# Patient Record
Sex: FEMALE | Race: WHITE | NOT HISPANIC OR LATINO | Employment: FULL TIME | URBAN - METROPOLITAN AREA
[De-identification: names, ages, dates, MRNs, and addresses within clinical notes are randomized per-mention and may not be internally consistent; named-entity substitution may affect disease eponyms.]

---

## 2018-05-24 ENCOUNTER — OFFICE VISIT (OUTPATIENT)
Dept: PRIMARY CARE | Facility: CLINIC | Age: 43
End: 2018-05-24
Payer: COMMERCIAL

## 2018-05-24 VITALS
HEIGHT: 66 IN | SYSTOLIC BLOOD PRESSURE: 128 MMHG | HEART RATE: 101 BPM | OXYGEN SATURATION: 96 % | BODY MASS INDEX: 37.41 KG/M2 | TEMPERATURE: 98.2 F | DIASTOLIC BLOOD PRESSURE: 86 MMHG | RESPIRATION RATE: 16 BRPM | WEIGHT: 232.8 LBS

## 2018-05-24 DIAGNOSIS — K58.0 IRRITABLE BOWEL SYNDROME WITH DIARRHEA: ICD-10-CM

## 2018-05-24 DIAGNOSIS — Z00.00 ENCOUNTER FOR GENERAL ADULT MEDICAL EXAMINATION WITHOUT ABNORMAL FINDINGS: Primary | ICD-10-CM

## 2018-05-24 DIAGNOSIS — M19.90 ARTHRITIS: ICD-10-CM

## 2018-05-24 DIAGNOSIS — F17.210 CIGARETTE NICOTINE DEPENDENCE WITHOUT COMPLICATION: ICD-10-CM

## 2018-05-24 PROBLEM — K58.9 IBS (IRRITABLE BOWEL SYNDROME): Status: ACTIVE | Noted: 2018-05-24

## 2018-05-24 PROCEDURE — 99386 PREV VISIT NEW AGE 40-64: CPT | Performed by: FAMILY MEDICINE

## 2018-05-24 RX ORDER — VARENICLINE TARTRATE 0.5 (11)-1
KIT ORAL
Qty: 53 TABLET | Refills: 0 | Status: SHIPPED | OUTPATIENT
Start: 2018-05-24 | End: 2019-02-19

## 2018-05-24 ASSESSMENT — ENCOUNTER SYMPTOMS
APPETITE CHANGE: 0
FEVER: 0
SEIZURES: 0
FATIGUE: 0
ADENOPATHY: 0
HEADACHES: 0
VOMITING: 0
DYSPHORIC MOOD: 0
ABDOMINAL PAIN: 0
RHINORRHEA: 0
CONSTIPATION: 0
ARTHRALGIAS: 0
NAUSEA: 0
FREQUENCY: 0
COUGH: 0
BLOOD IN STOOL: 0
SHORTNESS OF BREATH: 0
UNEXPECTED WEIGHT CHANGE: 0
DYSURIA: 0
WEAKNESS: 0
SORE THROAT: 0
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
## 2018-05-24 NOTE — ASSESSMENT & PLAN NOTE
-Eat diet with regular meals including 2-4 fruit servings , 2-4 vegetable servings, whole grains and lean protien each day  -control portions of carbs and keep down fats and sugars in diet  -exercise for 150 mins per week of cardio or more and 1-2 days per week of something strengthening like yoga, pilates or lifting  -wear sunblock w SPF of 30 or higher  in sun to protect your skin, and reapply often  -avoid all tobacco products  -limit alcohol and caffiene  -aim to get 6-8 hrs of sleep each night  -see your eye doctor every 2-3 yrs  -see your dentist every 6-12 mos  -BW and mammo ordered  -gyn # given  FU for physical in one year

## 2018-08-01 RX ORDER — VARENICLINE TARTRATE 0.5 MG/1
1 TABLET, FILM COATED ORAL 2 TIMES DAILY
Qty: 360 TABLET | Refills: 0 | Status: SHIPPED | OUTPATIENT
Start: 2018-08-01 | End: 2018-09-19 | Stop reason: ALTCHOICE

## 2018-08-01 NOTE — TELEPHONE ENCOUNTER
Called pt to determine if done starting dose pack. No answer, so LVM for return call  Spoke w pharm, and they are unsure.

## 2018-09-19 ENCOUNTER — OFFICE VISIT (OUTPATIENT)
Dept: OBSTETRICS AND GYNECOLOGY | Facility: CLINIC | Age: 43
End: 2018-09-19
Payer: COMMERCIAL

## 2018-09-19 VITALS
HEIGHT: 67 IN | WEIGHT: 234.4 LBS | SYSTOLIC BLOOD PRESSURE: 112 MMHG | BODY MASS INDEX: 36.79 KG/M2 | DIASTOLIC BLOOD PRESSURE: 70 MMHG

## 2018-09-19 DIAGNOSIS — F32.81 PMDD (PREMENSTRUAL DYSPHORIC DISORDER): ICD-10-CM

## 2018-09-19 DIAGNOSIS — G43.829 MENSTRUAL MIGRAINE WITHOUT STATUS MIGRAINOSUS, NOT INTRACTABLE: ICD-10-CM

## 2018-09-19 DIAGNOSIS — Z01.419 ENCOUNTER FOR GYNECOLOGICAL EXAMINATION: Primary | ICD-10-CM

## 2018-09-19 PROCEDURE — 99386 PREV VISIT NEW AGE 40-64: CPT | Mod: 25 | Performed by: OBSTETRICS & GYNECOLOGY

## 2018-09-19 RX ORDER — LEVONORGESTREL / ETHINYL ESTRADIOL AND ETHINYL ESTRADIOL 150-30(84)
1 KIT ORAL DAILY
Qty: 91 TABLET | Refills: 0 | Status: SHIPPED | OUTPATIENT
Start: 2018-09-19 | End: 2018-11-27 | Stop reason: SDUPTHER

## 2018-09-19 NOTE — PROGRESS NOTES
"2018  Indu Epstein is a 43 y.o. female who presents for annual exam.      Over the last 2-3 months has noted significant emotional changes prior to periods, irritable, has had suicidal thoughts.  No plan.  Generally not a depressed person.       Just quit smoking 4 months ago.     OB History:   Obstetric History       T0      L0     SAB0   TAB0   Ectopic0   Multiple0   Live Births0       # Outcome Date GA Lbr Augustin/2nd Weight Sex Delivery Anes PTL Lv   3 Para            2 Para            1 Para               Obstetric Comments   3 FTSVDS   No gHTN/ gDM       Gyn History:    Patient's last menstrual period was 2018.  Menses monthly q 24-28, lasts 2-6 days, flow is occasionally heavy- using super plus, changing every 2 hours, no intermenstrual spotting, cramps - doesn't take medication, heating pad helps  ? Fibroids  No hx of STIs  Pap- history of abn paps, 7 years ago- had colposcopies which were normal   Sexually active with 1 partner (together 2 months)- no pain/ bleeding   Contraception- condoms \"all of the time\"  Mammo- has not had       Medical History:   Past Medical History:   Diagnosis Date   • Arthritis     knees   • IBS (irritable bowel syndrome)     w diarrhea       Surgical History:   Past Surgical History:   Procedure Laterality Date   • CHOLECYSTECTOMY     • TONSILLECTOMY         Allergies: Penicillins; Sulfasalazine; and Zinc    Prior to Admission medications    Medication Sig Start Date End Date Taking? Authorizing Provider   varenicline (CHANTIX) 0.5 mg tablet Take 2 tablets (1 mg total) by mouth 2 (two) times a day. Take with full glass of water.  Patient not taking: Reported on 2018  Amy Morris DO        Social History: live with kids    No exercise   Social History     Social History   • Marital status: Single     Spouse name: N/A   • Number of children: 3   • Years of education: N/A     Occupational History   • manages " "[employer]      Social History Main Topics   • Smoking status: Current Every Day Smoker     Packs/day: 0.75     Years: 15.00   • Smokeless tobacco: Never Used      Comment: quit 3x in past w Chantix   • Alcohol use No   • Drug use: No   • Sexual activity: Not on file     Other Topics Concern   • Not on file     Social History Narrative   • No narrative on file        Family History:   Family History   Problem Relation Age of Onset   • Hypertension Mother    • Arthritis Mother    • Melanoma Mother    • Lung cancer Maternal Grandfather    • Melanoma Paternal Grandfather        Review of Systems and Physical Exam  The following have been reviewed and updated as appropriate in this visit: Allergies       /70   Ht 1.702 m (5' 7\")   Wt 106 kg (234 lb 6.4 oz)   LMP 09/04/2018   BMI 36.71 kg/m²   HPI  Review of Systems  Physical Exam   Constitutional: She is oriented to person, place, and time. She appears well-developed and well-nourished.   Genitourinary: Vagina normal and uterus normal.   External female genitalia normal.   Urethral meatus normal.   Urethra normal.   Normal bladder.   Vagina normal.   Cervix exam normal.  Uterus is normal. Uterine contour is regular.   Adnexa normal.   Neck: Normal range of motion. Neck supple. No thyromegaly present.   Cardiovascular: Normal rate, regular rhythm and normal heart sounds.    Pulmonary/Chest: Effort normal and breath sounds normal. No respiratory distress. She has no wheezes. She has no rales. Right breast exhibits no inverted nipple, no mass, no nipple discharge, no skin change and no tenderness. Left breast exhibits no inverted nipple, no mass, no nipple discharge, no skin change and no tenderness.   Abdominal: Soft. She exhibits no distension and no mass. There is no tenderness. There is no rebound.   Neurological: She is alert and oriented to person, place, and time.   Psychiatric: She has a normal mood and affect. Her behavior is normal. Thought content " normal.       Diagnoses and all orders for this visit:    Encounter for gynecological examination  Normal exam  Pap with HPV collected  Has script for mammogram    PMDD (premenstrual dysphoric disorder)  Discussed management options including SSRIs and OCPs  She just quit smoking 4 months ago successfully  Would like to take OCPs- discussed SE.  Will start on seasonique.  To return to office in 2 months for a BP check  Discussed that if she is to start smoking again she needs to stop right away!    Menstrual migraine without status migrainosus, not intractable  OCPs started     Other orders  -     L norgest/e.estradiol-e.estrad (SEASONIQUE) 0.15 mg-30 mcg (84)/10 mcg (7) tablet; Take 1 tablet by mouth daily.    Nancy Garner MD

## 2018-09-26 LAB
CYTOLOGIST CVX/VAG CYTO: NORMAL
CYTOLOGY CVX/VAG DOC THIN PREP: NORMAL
DX ICD CODE: NORMAL
HPV I/H RISK 4 DNA CVX QL PROBE+SIG AMP: NEGATIVE
LAB CORP NOTE:: NORMAL
LAB CORP QC REVIEWED BY:: NORMAL
OTHER STN SPEC: NORMAL
PATH REPORT.FINAL DX SPEC: NORMAL
STAT OF ADQ CVX/VAG CYTO-IMP: NORMAL

## 2018-11-27 ENCOUNTER — OFFICE VISIT (OUTPATIENT)
Dept: OBSTETRICS AND GYNECOLOGY | Facility: CLINIC | Age: 43
End: 2018-11-27
Payer: COMMERCIAL

## 2018-11-27 VITALS
SYSTOLIC BLOOD PRESSURE: 128 MMHG | BODY MASS INDEX: 37.57 KG/M2 | HEIGHT: 66 IN | WEIGHT: 233.8 LBS | DIASTOLIC BLOOD PRESSURE: 88 MMHG

## 2018-11-27 DIAGNOSIS — Z01.30 BP CHECK: Primary | ICD-10-CM

## 2018-11-27 PROCEDURE — 99212 OFFICE O/P EST SF 10 MIN: CPT | Performed by: NURSE PRACTITIONER

## 2018-11-27 RX ORDER — LEVONORGESTREL / ETHINYL ESTRADIOL AND ETHINYL ESTRADIOL 150-30(84)
1 KIT ORAL DAILY
Qty: 91 TABLET | Refills: 2 | Status: SHIPPED | OUTPATIENT
Start: 2018-11-27 | End: 2019-08-19 | Stop reason: SDUPTHER

## 2018-11-27 NOTE — PROGRESS NOTES
43 year old happy with Seasonique, no BTB. Has continued to not smoke!    B/P WNL  NO CI to continue FARIDA's-Rx sent out  Not smoking-doing well  RTO: AV/PRN

## 2019-02-19 ENCOUNTER — OFFICE VISIT (OUTPATIENT)
Dept: PRIMARY CARE | Facility: CLINIC | Age: 44
End: 2019-02-19
Payer: COMMERCIAL

## 2019-02-19 VITALS
HEART RATE: 108 BPM | WEIGHT: 231.2 LBS | OXYGEN SATURATION: 98 % | TEMPERATURE: 97.4 F | RESPIRATION RATE: 16 BRPM | DIASTOLIC BLOOD PRESSURE: 88 MMHG | SYSTOLIC BLOOD PRESSURE: 146 MMHG | HEIGHT: 66 IN | BODY MASS INDEX: 37.16 KG/M2

## 2019-02-19 DIAGNOSIS — M54.42 ACUTE BILATERAL LOW BACK PAIN WITH LEFT-SIDED SCIATICA: Primary | ICD-10-CM

## 2019-02-19 PROBLEM — F17.210 CIGARETTE NICOTINE DEPENDENCE WITHOUT COMPLICATION: Status: RESOLVED | Noted: 2018-05-24 | Resolved: 2019-02-19

## 2019-02-19 PROCEDURE — 99214 OFFICE O/P EST MOD 30 MIN: CPT | Performed by: FAMILY MEDICINE

## 2019-02-19 RX ORDER — CYCLOBENZAPRINE HCL 10 MG
10 TABLET ORAL 3 TIMES DAILY PRN
Qty: 30 TABLET | Refills: 0 | Status: SHIPPED | OUTPATIENT
Start: 2019-02-19 | End: 2019-04-03

## 2019-02-19 RX ORDER — METHYLPREDNISOLONE 4 MG/1
4 TABLET ORAL
Qty: 21 TABLET | Refills: 0 | Status: SHIPPED | OUTPATIENT
Start: 2019-02-19 | End: 2019-04-03

## 2019-02-19 ASSESSMENT — ENCOUNTER SYMPTOMS
MYALGIAS: 0
PALPITATIONS: 0
FATIGUE: 0
WEAKNESS: 0
ACTIVITY CHANGE: 0
ARTHRALGIAS: 0
JOINT SWELLING: 0
DIZZINESS: 0
FEVER: 0
FREQUENCY: 0
SHORTNESS OF BREATH: 0
APPETITE CHANGE: 0
NUMBNESS: 0
DIFFICULTY URINATING: 0
DYSPHORIC MOOD: 0
HEADACHES: 0
ABDOMINAL PAIN: 0
AGITATION: 0
UNEXPECTED WEIGHT CHANGE: 0
BACK PAIN: 1
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
## 2019-02-19 NOTE — ASSESSMENT & PLAN NOTE
Back care and education   Exercise sheet given  Heat, rest , short walks as tolerated  Medrol dose pack, hold Ibuprofen during this  Cyclobenzaprine TID prn  Call if no better or worse

## 2019-04-03 ENCOUNTER — OFFICE VISIT (OUTPATIENT)
Dept: PRIMARY CARE | Facility: CLINIC | Age: 44
End: 2019-04-03
Payer: COMMERCIAL

## 2019-04-03 VITALS
OXYGEN SATURATION: 98 % | TEMPERATURE: 97.3 F | SYSTOLIC BLOOD PRESSURE: 122 MMHG | WEIGHT: 234.2 LBS | RESPIRATION RATE: 16 BRPM | BODY MASS INDEX: 37.64 KG/M2 | HEIGHT: 66 IN | HEART RATE: 85 BPM | DIASTOLIC BLOOD PRESSURE: 84 MMHG

## 2019-04-03 DIAGNOSIS — R60.0 LOCALIZED EDEMA: ICD-10-CM

## 2019-04-03 DIAGNOSIS — J30.2 SEASONAL ALLERGIC RHINITIS, UNSPECIFIED TRIGGER: ICD-10-CM

## 2019-04-03 DIAGNOSIS — K21.9 GERD WITHOUT ESOPHAGITIS: ICD-10-CM

## 2019-04-03 DIAGNOSIS — G89.29 CHRONIC CHEST WALL PAIN: Primary | ICD-10-CM

## 2019-04-03 DIAGNOSIS — R07.89 CHRONIC CHEST WALL PAIN: Primary | ICD-10-CM

## 2019-04-03 PROCEDURE — 99214 OFFICE O/P EST MOD 30 MIN: CPT | Performed by: FAMILY MEDICINE

## 2019-04-03 ASSESSMENT — ENCOUNTER SYMPTOMS
NAUSEA: 0
DYSURIA: 0
DYSPHORIC MOOD: 0
COUGH: 0
COLOR CHANGE: 0
SHORTNESS OF BREATH: 0
ROS GI COMMENTS: NO CHANGE IN BMS  NO GER
SORE THROAT: 0
ABDOMINAL PAIN: 0
NUMBNESS: 0
VOMITING: 0
FREQUENCY: 0
WHEEZING: 0
FEVER: 0
UNEXPECTED WEIGHT CHANGE: 0
WEAKNESS: 0

## 2019-04-03 NOTE — ASSESSMENT & PLAN NOTE
Which may be a pinched nerve or muscular so gradually increasing low impact cardio, avoiding heavy lifting reviewed and if returns and not improving then to call back

## 2019-04-03 NOTE — ASSESSMENT & PLAN NOTE
Triggers reviewed such as salt, heat, standing or sitting prolongedly, wt gain.  Discussed actions to help, wt loss, compression stockings, elevation, keeping down salt and concerning changes or additional symptoms or no improvement to be reported
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
## 2019-05-31 LAB
ALBUMIN SERPL-MCNC: 4.4 G/DL (ref 3.5–5.5)
ALBUMIN/GLOB SERPL: 1.8 {RATIO} (ref 1.2–2.2)
ALP SERPL-CCNC: 51 IU/L (ref 39–117)
ALT SERPL-CCNC: 26 IU/L (ref 0–32)
AST SERPL-CCNC: 18 IU/L (ref 0–40)
BASOPHILS # BLD AUTO: 0 X10E3/UL (ref 0–0.2)
BASOPHILS NFR BLD AUTO: 0 %
BILIRUB SERPL-MCNC: 0.4 MG/DL (ref 0–1.2)
BUN SERPL-MCNC: 13 MG/DL (ref 6–24)
BUN/CREAT SERPL: 17 (ref 9–23)
CALCIUM SERPL-MCNC: 9.3 MG/DL (ref 8.7–10.2)
CHLORIDE SERPL-SCNC: 108 MMOL/L (ref 96–106)
CHOLEST SERPL-MCNC: 173 MG/DL (ref 100–199)
CO2 SERPL-SCNC: 18 MMOL/L (ref 20–29)
CREAT SERPL-MCNC: 0.78 MG/DL (ref 0.57–1)
EOSINOPHIL # BLD AUTO: 0.2 X10E3/UL (ref 0–0.4)
EOSINOPHIL NFR BLD AUTO: 6 %
ERYTHROCYTE [DISTWIDTH] IN BLOOD BY AUTOMATED COUNT: 13.5 % (ref 12.3–15.4)
GLOBULIN SER CALC-MCNC: 2.5 G/DL (ref 1.5–4.5)
GLUCOSE SERPL-MCNC: 89 MG/DL (ref 65–99)
HBA1C MFR BLD: 5.6 % (ref 4.8–5.6)
HCT VFR BLD AUTO: 39.5 % (ref 34–46.6)
HDLC SERPL-MCNC: 37 MG/DL
HGB BLD-MCNC: 13 G/DL (ref 11.1–15.9)
IMM GRANULOCYTES # BLD AUTO: 0 X10E3/UL (ref 0–0.1)
IMM GRANULOCYTES NFR BLD AUTO: 0 %
LAB CORP EGFR IF AFRICN AM: 108 ML/MIN/1.73
LAB CORP EGFR IF NONAFRICN AM: 93 ML/MIN/1.73
LDLC SERPL CALC-MCNC: 113 MG/DL (ref 0–99)
LYMPHOCYTES # BLD AUTO: 1.5 X10E3/UL (ref 0.7–3.1)
LYMPHOCYTES NFR BLD AUTO: 50 %
MCH RBC QN AUTO: 29.6 PG (ref 26.6–33)
MCHC RBC AUTO-ENTMCNC: 32.9 G/DL (ref 31.5–35.7)
MCV RBC AUTO: 90 FL (ref 79–97)
MONOCYTES # BLD AUTO: 0.2 X10E3/UL (ref 0.1–0.9)
MONOCYTES NFR BLD AUTO: 7 %
NEUTROPHILS # BLD AUTO: 1.1 X10E3/UL (ref 1.4–7)
NEUTROPHILS NFR BLD AUTO: 37 %
PLATELET # BLD AUTO: 266 X10E3/UL (ref 150–450)
POTASSIUM SERPL-SCNC: 4.4 MMOL/L (ref 3.5–5.2)
PROT SERPL-MCNC: 6.9 G/DL (ref 6–8.5)
RBC # BLD AUTO: 4.39 X10E6/UL (ref 3.77–5.28)
SODIUM SERPL-SCNC: 141 MMOL/L (ref 134–144)
SPECIMEN STATUS: NORMAL
TRIGL SERPL-MCNC: 116 MG/DL (ref 0–149)
TSH SERPL DL<=0.005 MIU/L-ACNC: 1.81 UIU/ML (ref 0.45–4.5)
VLDLC SERPL CALC-MCNC: 23 MG/DL (ref 5–40)
WBC # BLD AUTO: 3 X10E3/UL (ref 3.4–10.8)

## 2019-06-03 ENCOUNTER — TELEPHONE (OUTPATIENT)
Dept: PRIMARY CARE | Facility: CLINIC | Age: 44
End: 2019-06-03

## 2019-06-03 DIAGNOSIS — D72.9 ABNORMAL WBC COUNT: Primary | ICD-10-CM

## 2019-06-03 NOTE — TELEPHONE ENCOUNTER
----- Message from Amy Morris, DO sent at 6/3/2019 12:44 PM EDT -----  Indu, Please let patient know that thyroid, BS,kidneys,liver,electrolytes and Chol w breakdown are all normal. She is not anemic , but her WBC is a little low which may just be a fluctuation but to be sure I would like her to rechk CBC in 3 wks , so before our nxt appt , to be sure.

## 2019-06-28 ENCOUNTER — TELEPHONE (OUTPATIENT)
Dept: PRIMARY CARE | Facility: CLINIC | Age: 44
End: 2019-06-28

## 2019-06-28 DIAGNOSIS — D72.9 ABNORMAL WBC COUNT: Primary | ICD-10-CM

## 2019-06-28 LAB
ERYTHROCYTE [DISTWIDTH] IN BLOOD BY AUTOMATED COUNT: 13.8 % (ref 12.3–15.4)
HCT VFR BLD AUTO: 38 % (ref 34–46.6)
HGB BLD-MCNC: 12.9 G/DL (ref 11.1–15.9)
MCH RBC QN AUTO: 30.7 PG (ref 26.6–33)
MCHC RBC AUTO-ENTMCNC: 33.9 G/DL (ref 31.5–35.7)
MCV RBC AUTO: 91 FL (ref 79–97)
PLATELET # BLD AUTO: 257 X10E3/UL (ref 150–450)
RBC # BLD AUTO: 4.2 X10E6/UL (ref 3.77–5.28)
WBC # BLD AUTO: 2.8 X10E3/UL (ref 3.4–10.8)

## 2019-06-28 NOTE — TELEPHONE ENCOUNTER
----- Message from Amy Morris DO sent at 6/28/2019  1:06 PM EDT -----  Let her knkow that her blood count is fairly stable compared to last so just recheck it in 6 wks again

## 2019-06-28 NOTE — TELEPHONE ENCOUNTER
Let her knkow that her blood count is fairly stable compared to last so just recheck it in 6 wks again

## 2019-06-28 NOTE — TELEPHONE ENCOUNTER
No need to be unless it is changing a lot but is likely just a normal variation since rest of blood count is stable so just being cautious to be sure it is not changing.

## 2019-07-02 ENCOUNTER — OFFICE VISIT (OUTPATIENT)
Dept: PRIMARY CARE | Facility: CLINIC | Age: 44
End: 2019-07-02
Payer: COMMERCIAL

## 2019-07-02 VITALS
RESPIRATION RATE: 16 BRPM | HEIGHT: 66 IN | SYSTOLIC BLOOD PRESSURE: 144 MMHG | BODY MASS INDEX: 35 KG/M2 | WEIGHT: 217.8 LBS | TEMPERATURE: 97.9 F | HEART RATE: 87 BPM | DIASTOLIC BLOOD PRESSURE: 98 MMHG | OXYGEN SATURATION: 98 %

## 2019-07-02 DIAGNOSIS — K21.9 GERD WITHOUT ESOPHAGITIS: ICD-10-CM

## 2019-07-02 DIAGNOSIS — Z00.00 ENCOUNTER FOR GENERAL ADULT MEDICAL EXAMINATION WITHOUT ABNORMAL FINDINGS: Primary | ICD-10-CM

## 2019-07-02 DIAGNOSIS — G89.29 CHRONIC RUQ PAIN: ICD-10-CM

## 2019-07-02 DIAGNOSIS — J30.2 SEASONAL ALLERGIC RHINITIS, UNSPECIFIED TRIGGER: ICD-10-CM

## 2019-07-02 DIAGNOSIS — R10.11 CHRONIC RUQ PAIN: ICD-10-CM

## 2019-07-02 PROCEDURE — 90471 IMMUNIZATION ADMIN: CPT | Performed by: FAMILY MEDICINE

## 2019-07-02 PROCEDURE — 90715 TDAP VACCINE 7 YRS/> IM: CPT | Performed by: FAMILY MEDICINE

## 2019-07-02 PROCEDURE — 99396 PREV VISIT EST AGE 40-64: CPT | Mod: 25 | Performed by: FAMILY MEDICINE

## 2019-07-02 ASSESSMENT — ENCOUNTER SYMPTOMS
WEAKNESS: 0
UNEXPECTED WEIGHT CHANGE: 0
FATIGUE: 0
SINUS PRESSURE: 1
RHINORRHEA: 0
BLOOD IN STOOL: 0
DYSPHORIC MOOD: 0
COUGH: 0
BACK PAIN: 1
FEVER: 0
ARTHRALGIAS: 0
NAUSEA: 0
VOMITING: 0
SORE THROAT: 0
SHORTNESS OF BREATH: 0
CONSTIPATION: 0
FREQUENCY: 0
APPETITE CHANGE: 0
ABDOMINAL PAIN: 0
DIARRHEA: 0
WHEEZING: 0
SEIZURES: 0
DYSURIA: 0
ADENOPATHY: 0
HEADACHES: 0
EYE DISCHARGE: 0

## 2019-07-02 NOTE — ASSESSMENT & PLAN NOTE
-Eat diet with regular meals including 2-4 fruit servings , 2-4 vegetable servings, whole grains and lean protien each day  -control portions of carbs and keep down fats and sugars in diet  -exercise for 150 mins per week of cardio or more and 1-2 days per week of something strengthening like yoga, pilates or lifting  -wear sunblock w SPF of 30 or higher  in sun to protect your skin, and reapply often  -avoid all tobacco products  -limit alcohol and caffiene  -aim to get 6-8 hrs of sleep each night  -see your eye doctor every 2-3 yrs  -see your dentist every 6-12 mos  -TdaP given   -mammo ordered  FU for physical in one year
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
## 2019-07-25 ENCOUNTER — HOSPITAL ENCOUNTER (OUTPATIENT)
Dept: RADIOLOGY | Age: 44
Discharge: HOME | End: 2019-07-25
Attending: FAMILY MEDICINE
Payer: COMMERCIAL

## 2019-07-25 DIAGNOSIS — R10.11 CHRONIC RUQ PAIN: ICD-10-CM

## 2019-07-25 DIAGNOSIS — G89.29 CHRONIC RUQ PAIN: ICD-10-CM

## 2019-07-25 DIAGNOSIS — Z00.00 ENCOUNTER FOR GENERAL ADULT MEDICAL EXAMINATION WITHOUT ABNORMAL FINDINGS: ICD-10-CM

## 2019-07-25 PROCEDURE — 77063 BREAST TOMOSYNTHESIS BI: CPT

## 2019-07-25 PROCEDURE — 76705 ECHO EXAM OF ABDOMEN: CPT

## 2019-07-26 ENCOUNTER — TELEPHONE (OUTPATIENT)
Dept: PRIMARY CARE | Facility: CLINIC | Age: 44
End: 2019-07-26

## 2019-07-26 ENCOUNTER — TRANSCRIBE ORDERS (OUTPATIENT)
Dept: RADIOLOGY | Age: 44
End: 2019-07-26

## 2019-07-26 DIAGNOSIS — R92.8 OTHER ABNORMAL AND INCONCLUSIVE FINDINGS ON DIAGNOSTIC IMAGING OF BREAST: Primary | ICD-10-CM

## 2019-07-26 NOTE — TELEPHONE ENCOUNTER
----- Message from Amy Morris DO sent at 7/25/2019  9:05 PM EDT -----  Please let her know radiologist would like her to return for follow-up views due to some asymmetry

## 2019-07-26 NOTE — TELEPHONE ENCOUNTER
----- Message from Amy Morrsi DO sent at 7/25/2019  9:15 PM EDT -----  Also let her know that her ultrasound appears to be normal

## 2019-07-30 ENCOUNTER — HOSPITAL ENCOUNTER (OUTPATIENT)
Dept: RADIOLOGY | Age: 44
Discharge: HOME | End: 2019-07-30
Attending: RADIOLOGY
Payer: COMMERCIAL

## 2019-07-30 DIAGNOSIS — R92.8 OTHER ABNORMAL AND INCONCLUSIVE FINDINGS ON DIAGNOSTIC IMAGING OF BREAST: ICD-10-CM

## 2019-07-30 PROCEDURE — 77065 DX MAMMO INCL CAD UNI: CPT | Mod: RT

## 2019-07-30 PROCEDURE — 76642 ULTRASOUND BREAST LIMITED: CPT | Mod: RT

## 2019-07-30 NOTE — PROGRESS NOTES
Indu,  Radiology would like you to get a FU on your study in 6 mos for stability on cyst they see.  - Dr HERRERA

## 2019-08-16 ENCOUNTER — TELEPHONE (OUTPATIENT)
Dept: PRIMARY CARE | Facility: CLINIC | Age: 44
End: 2019-08-16

## 2019-08-16 DIAGNOSIS — R79.89 ABNORMAL CBC: Primary | ICD-10-CM

## 2019-08-16 LAB
ERYTHROCYTE [DISTWIDTH] IN BLOOD BY AUTOMATED COUNT: 13.9 % (ref 12.3–15.4)
HCT VFR BLD AUTO: 38.7 % (ref 34–46.6)
HGB BLD-MCNC: 12.8 G/DL (ref 11.1–15.9)
MCH RBC QN AUTO: 30.5 PG (ref 26.6–33)
MCHC RBC AUTO-ENTMCNC: 33.1 G/DL (ref 31.5–35.7)
MCV RBC AUTO: 92 FL (ref 79–97)
NRBC BLD AUTO-RTO: 1 %
PLATELET # BLD AUTO: 249 X10E3/UL (ref 150–450)
RBC # BLD AUTO: 4.2 X10E6/UL (ref 3.77–5.28)
WBC # BLD AUTO: 2.9 X10E3/UL (ref 3.4–10.8)

## 2019-08-16 NOTE — TELEPHONE ENCOUNTER
PT informed. She would like to know if she should be concerned about the NRCB being at a 1. She looked into it and is very worried.    Pt also stated her follow up breast US showed cysts. She has been trying to lose more weight and is noticing now as she is losing lbs that the cyst is protruding more and is tender when she brushes it with her arm. She wondered instead of waiting for a follow up mammo/US, if having it drained or removed would be an option? She just has concerns that maybe it is getting bigger and that is the reason for the tenderness.

## 2019-08-16 NOTE — TELEPHONE ENCOUNTER
Since she has remained stable w WBC I don't feel anything worrisome is going on now so we will track it w the FU BW.   If the cyst is uncomfortable I would like her to see a breast specialist so give her Dr Rivas's # to get an eval with for this.

## 2019-08-16 NOTE — TELEPHONE ENCOUNTER
----- Message from Amy Morris, DO sent at 8/16/2019 12:46 PM EDT -----  Let her know that her blood count is stable so I just want her to rechk it in 2 mos

## 2019-08-19 ENCOUNTER — TELEPHONE (OUTPATIENT)
Dept: OBSTETRICS AND GYNECOLOGY | Facility: CLINIC | Age: 44
End: 2019-08-19

## 2019-08-19 RX ORDER — LEVONORGESTREL / ETHINYL ESTRADIOL AND ETHINYL ESTRADIOL 150-30(84)
1 KIT ORAL DAILY
Qty: 91 TABLET | Refills: 0 | Status: SHIPPED | OUTPATIENT
Start: 2019-08-19 | End: 2019-09-23 | Stop reason: SDUPTHER

## 2019-09-18 NOTE — PROGRESS NOTES
2019  Indu Pringle is a 44 y.o. female who presents for annual examination.      Noted dark on R vulva- tried to pull it off with a tweezer and bled profusely.  Thought initially was a tick.    Over this year has been found to have a low white blood cell count.  Mammogram abnormal with cyst in R breast.  Plans to f/u with Dr. Tj Fall .      Started on Seasonique for PMDD and menstrual migraines- happy with method.  Not getting menses.    Pap 18 neg cytology/ HPV   Mammogram- 19 dx BIRADs 3 , 1.  Lesions within the right breast which have imaging characteristics of a  complicated cyst (at 9:00) and a simple cyst (at 10:00).  Follow-up mammography  and sonography with attention to the complicated cyst would be recommended in  six months.    Gyn History:    Not getting menses on Seasonique.    ? Fibroids  No hx of STIs  Pap- history of abn paps, 7 years ago- had colposcopies which were normal   Sexually active with 1 partner (together 1.5 years)- no pain/ bleeding   Contraception- condoms and OCPs  Pap 18 neg cytology/ HPV   Mammo 19 BIRADs 3, f/u in 6 months .  1.  Lesions within the right breast which have imaging characteristics of a  complicated cyst (at 9:00) and a simple cyst (at 10:00).  Follow-up mammography  and sonography with attention to the complicated cyst would be recommended in  six months.  Colonoscopy- has not had but might due to IBS, sees Dr. Florian       OB History:   Obstetric History       T0      L0     SAB0   TAB0   Ectopic0   Multiple0   Live Births0       # Outcome Date GA Lbr Augustin/2nd Weight Sex Delivery Anes PTL Lv   3 Para            2 Para            1 Para               Obstetric Comments   3 FTSVDS   No gHTN/ gDM         Medical History:   Past Medical History:   Diagnosis Date   • Arthritis     knees   • GERD without esophagitis    • IBS (irritable bowel syndrome)     w diarrhea   • Migraines     prior to or after periods, no aura   "      Surgical History:   Past Surgical History:   Procedure Laterality Date   • CHOLECYSTECTOMY     • TONSILLECTOMY         Allergies: Penicillins; Sulfasalazine; and Zinc    Prior to Admission medications    Medication Sig Start Date End Date Taking? Authorizing Provider   L norgest/e.estradiol-e.estrad (SEASONIQUE) 0.15 mg-30 mcg (84)/10 mcg (7) tablet Take 1 tablet by mouth daily. 8/19/19 8/18/20  Janel Ambrose CRNP        Social History:   Social History     Social History   • Marital status: Single     Spouse name: N/A   • Number of children: 3   • Years of education: N/A     Occupational History   • manages [employer]      Social History Main Topics   • Smoking status: Former Smoker     Packs/day: 0.75     Years: 15.00   • Smokeless tobacco: Never Used      Comment: quit 5/2018   • Alcohol use No   • Drug use: No   • Sexual activity: Not on file     Other Topics Concern   • Not on file     Social History Narrative   • No narrative on file        Family History:   Family History   Problem Relation Age of Onset   • Hypertension Biological Mother    • Arthritis Biological Mother    • Melanoma Biological Mother    • Lung cancer Maternal Grandfather    • Melanoma Maternal Grandfather    • Breast cancer Neg Hx    • Colon cancer Neg Hx    • Ovarian cancer Neg Hx        Review of Systems and Physical Exam  The following have been reviewed and updated as appropriate in this visit:      /90   Ht 1.676 m (5' 6\")   Wt 97.5 kg (215 lb)   LMP  (LMP Unknown)   BMI 34.70 kg/m²   HPI  Review of Systems  Physical Exam   Constitutional: She is oriented to person, place, and time. She appears well-developed and well-nourished.   Genitourinary: Vagina normal and uterus normal.   External female genitalia normal. There is lesion (small vascular appearing lesion on R vuvla) on the right external genitalia.   Urethral meatus normal.         Urethra normal.   Normal bladder.   Vagina normal.   Cervix exam " normal.  Uterus is normal. Uterine contour is regular.   Adnexa normal.   Neck: Normal range of motion. Neck supple. No thyromegaly present.   Cardiovascular: Normal rate, regular rhythm and normal heart sounds.    Pulmonary/Chest: Effort normal and breath sounds normal. No respiratory distress. She has no wheezes. She has no rales. Right breast exhibits mass (10 oclock, 5cm from nipple, mobile). Right breast exhibits no inverted nipple, no nipple discharge, no skin change and no tenderness. Left breast exhibits no inverted nipple, no mass, no nipple discharge, no skin change and no tenderness.   Abdominal: Soft. She exhibits no distension and no mass. There is no tenderness. There is no rebound.   Neurological: She is alert and oriented to person, place, and time.   Psychiatric: She has a normal mood and affect. Her behavior is normal. Thought content normal.           Diagnoses and all orders for this visit:    Abnormal mammogram of left breast  Has follow up script and appointment with breast surgeons.    Encounter for gynecological examination  Pap up to date  Happy with OCPs- BP elevated today, return in 3 months for BP check.  She understands if she has elevated BP she is at an increase risk of adverse side effects from OCPs.  Recommended IUD (Mirena) instead.  Brochure/ info provided.    Vulvar mass  Recommended biopsy, will return for procedure    Other orders  -     L norgest/e.estradiol-e.estrad (SEASONIQUE) 0.15 mg-30 mcg (84)/10 mcg (7) tablet; Take 1 tablet by mouth daily.  .    BP elevated- no diagnosis of HTN.  Will f/u in 3 months for BP check.   No Follow-up on file.  Nancy Garner MD

## 2019-09-23 ENCOUNTER — OFFICE VISIT (OUTPATIENT)
Dept: OBSTETRICS AND GYNECOLOGY | Facility: CLINIC | Age: 44
End: 2019-09-23
Payer: COMMERCIAL

## 2019-09-23 VITALS
SYSTOLIC BLOOD PRESSURE: 138 MMHG | BODY MASS INDEX: 34.55 KG/M2 | HEIGHT: 66 IN | DIASTOLIC BLOOD PRESSURE: 90 MMHG | WEIGHT: 215 LBS

## 2019-09-23 DIAGNOSIS — N90.89 VULVAR MASS: ICD-10-CM

## 2019-09-23 DIAGNOSIS — R92.8 ABNORMAL MAMMOGRAM OF LEFT BREAST: Primary | ICD-10-CM

## 2019-09-23 DIAGNOSIS — Z01.419 ENCOUNTER FOR GYNECOLOGICAL EXAMINATION: ICD-10-CM

## 2019-09-23 PROCEDURE — 99396 PREV VISIT EST AGE 40-64: CPT | Mod: 25 | Performed by: OBSTETRICS & GYNECOLOGY

## 2019-09-23 RX ORDER — LEVONORGESTREL / ETHINYL ESTRADIOL AND ETHINYL ESTRADIOL 150-30(84)
1 KIT ORAL DAILY
Qty: 91 TABLET | Refills: 0 | Status: SHIPPED | OUTPATIENT
Start: 2019-09-23 | End: 2021-04-05

## 2019-11-04 ENCOUNTER — PROCEDURE VISIT (OUTPATIENT)
Dept: OBSTETRICS AND GYNECOLOGY | Facility: CLINIC | Age: 44
End: 2019-11-04
Payer: COMMERCIAL

## 2019-11-04 VITALS — DIASTOLIC BLOOD PRESSURE: 84 MMHG | SYSTOLIC BLOOD PRESSURE: 160 MMHG

## 2019-11-04 DIAGNOSIS — Z30.430 ENCOUNTER FOR IUD INSERTION: ICD-10-CM

## 2019-11-04 DIAGNOSIS — N90.89 LABIAL LESION: ICD-10-CM

## 2019-11-04 DIAGNOSIS — Z32.02 NEGATIVE PREGNANCY TEST: Primary | ICD-10-CM

## 2019-11-04 LAB — PREGNANCY TEST URINE, POC: NEGATIVE

## 2019-11-04 PROCEDURE — 99999 PR OFFICE/OUTPT VISIT,PROCEDURE ONLY: CPT | Performed by: OBSTETRICS & GYNECOLOGY

## 2019-11-04 PROCEDURE — 81025 URINE PREGNANCY TEST: CPT | Performed by: OBSTETRICS & GYNECOLOGY

## 2019-11-05 ENCOUNTER — TELEPHONE (OUTPATIENT)
Dept: OBSTETRICS AND GYNECOLOGY | Facility: CLINIC | Age: 44
End: 2019-11-05

## 2019-11-05 ENCOUNTER — CLINICAL SUPPORT (OUTPATIENT)
Dept: OBSTETRICS AND GYNECOLOGY | Facility: CLINIC | Age: 44
End: 2019-11-05
Payer: COMMERCIAL

## 2019-11-05 ENCOUNTER — OFFICE VISIT (OUTPATIENT)
Dept: SURGERY | Facility: CLINIC | Age: 44
End: 2019-11-05
Payer: COMMERCIAL

## 2019-11-05 VITALS
SYSTOLIC BLOOD PRESSURE: 137 MMHG | HEART RATE: 71 BPM | WEIGHT: 210 LBS | BODY MASS INDEX: 33.75 KG/M2 | DIASTOLIC BLOOD PRESSURE: 91 MMHG | HEIGHT: 66 IN

## 2019-11-05 VITALS — SYSTOLIC BLOOD PRESSURE: 138 MMHG | DIASTOLIC BLOOD PRESSURE: 88 MMHG

## 2019-11-05 DIAGNOSIS — T83.32XA MALPOSITIONED INTRAUTERINE DEVICE (IUD), INITIAL ENCOUNTER: ICD-10-CM

## 2019-11-05 DIAGNOSIS — Z30.430 ENCOUNTER FOR IUD INSERTION: Primary | ICD-10-CM

## 2019-11-05 DIAGNOSIS — R92.8 ABNORMAL MAMMOGRAM OF LEFT BREAST: Primary | ICD-10-CM

## 2019-11-05 DIAGNOSIS — R92.8 ABNORMAL MAMMOGRAM: ICD-10-CM

## 2019-11-05 PROBLEM — Z00.00 ENCOUNTER FOR GENERAL ADULT MEDICAL EXAMINATION WITHOUT ABNORMAL FINDINGS: Status: RESOLVED | Noted: 2018-05-24 | Resolved: 2019-11-05

## 2019-11-05 PROCEDURE — 76942 ECHO GUIDE FOR BIOPSY: CPT | Performed by: SURGERY

## 2019-11-05 PROCEDURE — 19000 PUNCTURE ASPIR CYST BREAST: CPT | Mod: RT | Performed by: SURGERY

## 2019-11-05 PROCEDURE — 88173 CYTOPATH EVAL FNA REPORT: CPT | Performed by: SURGERY

## 2019-11-05 PROCEDURE — 99203 OFFICE O/P NEW LOW 30 MIN: CPT | Mod: 25 | Performed by: SURGERY

## 2019-11-05 PROCEDURE — 99999 PR OFFICE/OUTPT VISIT,PROCEDURE ONLY: CPT | Performed by: OBSTETRICS & GYNECOLOGY

## 2019-11-05 ASSESSMENT — ENCOUNTER SYMPTOMS
CONSTITUTIONAL NEGATIVE: 1
HEMATOLOGIC/LYMPHATIC NEGATIVE: 1
RESPIRATORY NEGATIVE: 1
ALLERGIC/IMMUNOLOGIC NEGATIVE: 1
EYES NEGATIVE: 1
PSYCHIATRIC NEGATIVE: 1
CARDIOVASCULAR NEGATIVE: 1
NEUROLOGICAL NEGATIVE: 1
GASTROINTESTINAL NEGATIVE: 1
ENDOCRINE NEGATIVE: 1
MUSCULOSKELETAL NEGATIVE: 1

## 2019-11-05 NOTE — PATIENT INSTRUCTIONS
I will call you within 48 hours to review the results of your cytology and make further recommendations regarding your mammographic follow-up.

## 2019-11-05 NOTE — PROGRESS NOTES
Breast Surgical Consultation        Patient: Indu Pringle                                : 1975    Date of consultation: 2019    Referring Provider: No ref. provider found  PCP: Amy Morris DO  GYN: No care team member to display      Chief Complaint: Tender right breast mass     Indu Pringle is a 44 y.o. old female presenting today for further evaluation regarding a tender right breast mass.  She states that she is been on  Seasonique for many years but was recently pulled off this by her gynecologist given her age and hypertension.  She is now officially off the pill and had a Mirena IUD placed yesterday.  In July, she had aRoutine screening mammogram that was reviewed s in the office.  She is modestly dense tissue and there was a rounded ovoid mass in the upper outer portion of the right breast.  This corresponded to 2 cysts by ultrasound.  At 9:00, 5 cm from the nipple there was a 2-1/2 cm minimally septated cyst deemed to be complex for which a six-month follow-up was recommended.  There was additionally a 1.6 cm hypoechoic lesion at 10:00, 3 cm from the nipple which was felt to be completely benign and no further follow-up was recommended.  Initially, this did not bother her, but the cyst appeared to become more tender over time.  She is recently noted that every time she turns around she seems to bump against the cyst at 9 o'clock position and has discomfort and she comes in today for further evaluation.     Breast Imaging: See above.  All films were reviewed in the office.    Breast History: None significant    OB/GYN Status    Currently Pregnant:  N/A   Last Menstrual Period:  N/A   Menstrual Status:   Having periods   LMP Comment:   N/A   Menarche Age:     Breastfeeding?  N/A   Lactation Comment:   N/A     OB History        3    Para   3    Term   3            AB        Living           SAB        TAB        Ectopic        Multiple        Live Births          "      Obstetric Comments   3 FTSVDS  No gHTN/ gDM                  Medications: has a current medication list which includes the following prescription(s): l norgest/e.estradiol-e.estrad.    Family History: family history includes Arthritis in her biological mother; Hypertension in her biological mother; Lung cancer in her maternal grandfather; Melanoma in her biological mother and maternal grandfather.  Past Medical History:  has a past medical history of Arthritis, GERD without esophagitis, IBS (irritable bowel syndrome), and Migraines.  Past Surgical History:  has a past surgical history that includes Cholecystectomy and Tonsillectomy.  Social History:  reports that she has quit smoking. She has a 11.25 pack-year smoking history. She has never used smokeless tobacco. She reports that she does not drink alcohol or use drugs.   Sexual History:  has no sexual activity history on file.  Allergies: is allergic to penicillins; sulfasalazine; and zinc.     Review of Systems   Constitutional: Negative.    HENT: Negative.    Eyes: Negative.    Respiratory: Negative.    Cardiovascular: Negative.    Gastrointestinal: Negative.    Endocrine: Negative.    Genitourinary: Negative.    Musculoskeletal: Negative.    Allergic/Immunologic: Negative.    Neurological: Negative.    Hematological: Negative.    Psychiatric/Behavioral: Negative.        Allergies  Meds  Problems         Objective   Vitals:   Visit Vitals  BP (!) 137/91   Pulse 71   Ht 1.676 m (5' 6\")   Wt 95.3 kg (210 lb)   LMP  (LMP Unknown) Comment: Seasonique Use   BMI 33.89 kg/m²     Physical Exam   Constitutional: She appears well-developed and well-nourished.   HENT:   Head: Normocephalic and atraumatic.   Neck: Normal range of motion. Neck supple. No thyromegaly present.   Pulmonary/Chest: Right breast exhibits mass and tenderness. Right breast exhibits no inverted nipple, no nipple discharge and no skin change. Left breast exhibits no inverted nipple, no mass, " no nipple discharge, no skin change and no tenderness. Breasts are symmetrical.   2 cm, minimally tender mobile mass without overlying skin changes       Musculoskeletal: Normal range of motion. She exhibits no edema or deformity.   Lymphadenopathy:     She has no cervical adenopathy.        Right cervical: No superficial cervical and no posterior cervical adenopathy present.       Left cervical: No superficial cervical and no posterior cervical adenopathy present.     She has no axillary adenopathy.        Right: No supraclavicular adenopathy present.        Left: No supraclavicular adenopathy present.   Skin: Skin is warm and dry.   Psychiatric: She has a normal mood and affect. Her behavior is normal.      I did ultrasound her area of concern and this corresponded to a minimally complex hypoechoic density with smooth margins and posterior acoustical enhancement that measures 2 x 1.9 x 1.6 cm.  With the patient's permission we proceeded with a cyst aspiration under local anesthesia.  The skin inferolateral was prepped with alcohol and infiltrated 1% lidocaine with epinephrine.  A 20-gauge needle was advanced into the mass and 3-1/2 cc of normal appearing cystic fluid was aspirated.  The cyst collapsed completely and this was sent for cytology.  Assessment/Plan   Problem List Items Addressed This Visit        Other    Abnormal mammogram     There is a minimally complex cyst in the right breast which was aspirated to resolution.  If the cytology is benign, she can resume routine screening bilateral mammograms next July.         RESOLVED: Abnormal mammogram of left breast - Primary             Return If the cyst recurs or you have any abnormal breast imaging.    Desiree Spencer MD

## 2019-11-05 NOTE — ASSESSMENT & PLAN NOTE
There is a minimally complex cyst in the right breast which was aspirated to resolution.  If the cytology is benign, she can resume routine screening bilateral mammograms next July.

## 2019-11-05 NOTE — LETTER
2019     Amy Morris DO  [address]  [place] [address]    Patient: Indu Pringle  YOB: 1975  Date of Visit: 2019      Dear Dr. Morris:    Thank you for referring Indu Pringle to me for evaluation. Below are my notes for this consultation.    If you have questions, please do not hesitate to call me. I look forward to following your patient along with you.         Sincerely,        Desiree Spencer MD        CC: MD Tj Valle Jennifer L, MD  2019  6:02 PM  Signed      Breast Surgical Consultation        Patient: Indu Pringle                                : 1975    Date of consultation: 2019    Referring Provider: No ref. provider found  PCP: Amy Morris DO  GYN: No care team member to display      Chief Complaint: Tender right breast mass     Indu Pringle is a 44 y.o. old female presenting today for further evaluation regarding a tender right breast mass.  She states that she is been on  Seasonique for many years but was recently pulled off this by her gynecologist given her age and hypertension.  She is now officially off the pill and had a Mirena IUD placed yesterday.  In July, she had aRoutine screening mammogram that was reviewed s in the office.  She is modestly dense tissue and there was a rounded ovoid mass in the upper outer portion of the right breast.  This corresponded to 2 cysts by ultrasound.  At 9:00, 5 cm from the nipple there was a 2-1/2 cm minimally septated cyst deemed to be complex for which a six-month follow-up was recommended.  There was additionally a 1.6 cm hypoechoic lesion at 10:00, 3 cm from the nipple which was felt to be completely benign and no further follow-up was recommended.  Initially, this did not bother her, but the cyst appeared to become more tender over time.  She is recently noted that every time she turns around she seems to bump against the cyst at 9  o'clock position and has discomfort and she comes in today for further evaluation.     Breast Imaging: See above.  All films were reviewed in the office.    Breast History: None significant    OB/GYN Status    Currently Pregnant:  N/A   Last Menstrual Period:  N/A   Menstrual Status:   Having periods   LMP Comment:   N/A   Menarche Age:     Breastfeeding?  N/A   Lactation Comment:   N/A     OB History        3    Para   3    Term   3            AB        Living           SAB        TAB        Ectopic        Multiple        Live Births               Obstetric Comments   3 FTSVDS  No gHTN/ gDM                  Medications: has a current medication list which includes the following prescription(s): l norgest/e.estradiol-e.estrad.    Family History: family history includes Arthritis in her biological mother; Hypertension in her biological mother; Lung cancer in her maternal grandfather; Melanoma in her biological mother and maternal grandfather.  Past Medical History:  has a past medical history of Arthritis, GERD without esophagitis, IBS (irritable bowel syndrome), and Migraines.  Past Surgical History:  has a past surgical history that includes Cholecystectomy and Tonsillectomy.  Social History:  reports that she has quit smoking. She has a 11.25 pack-year smoking history. She has never used smokeless tobacco. She reports that she does not drink alcohol or use drugs.   Sexual History:  has no sexual activity history on file.  Allergies: is allergic to penicillins; sulfasalazine; and zinc.     Review of Systems   Constitutional: Negative.    HENT: Negative.    Eyes: Negative.    Respiratory: Negative.    Cardiovascular: Negative.    Gastrointestinal: Negative.    Endocrine: Negative.    Genitourinary: Negative.    Musculoskeletal: Negative.    Allergic/Immunologic: Negative.    Neurological: Negative.    Hematological: Negative.    Psychiatric/Behavioral: Negative.        Allergies  Meds  Problems      "    Objective   Vitals:   Visit Vitals  BP (!) 137/91   Pulse 71   Ht 1.676 m (5' 6\")   Wt 95.3 kg (210 lb)   LMP  (LMP Unknown) Comment: Seasonique Use   BMI 33.89 kg/m²     Physical Exam   Constitutional: She appears well-developed and well-nourished.   HENT:   Head: Normocephalic and atraumatic.   Neck: Normal range of motion. Neck supple. No thyromegaly present.   Pulmonary/Chest: Right breast exhibits mass and tenderness. Right breast exhibits no inverted nipple, no nipple discharge and no skin change. Left breast exhibits no inverted nipple, no mass, no nipple discharge, no skin change and no tenderness. Breasts are symmetrical.   2 cm, minimally tender mobile mass without overlying skin changes       Musculoskeletal: Normal range of motion. She exhibits no edema or deformity.   Lymphadenopathy:     She has no cervical adenopathy.        Right cervical: No superficial cervical and no posterior cervical adenopathy present.       Left cervical: No superficial cervical and no posterior cervical adenopathy present.     She has no axillary adenopathy.        Right: No supraclavicular adenopathy present.        Left: No supraclavicular adenopathy present.   Skin: Skin is warm and dry.   Psychiatric: She has a normal mood and affect. Her behavior is normal.      I did ultrasound her area of concern and this corresponded to a minimally complex hypoechoic density with smooth margins and posterior acoustical enhancement that measures 2 x 1.9 x 1.6 cm.  With the patient's permission we proceeded with a cyst aspiration under local anesthesia.  The skin inferolateral was prepped with alcohol and infiltrated 1% lidocaine with epinephrine.  A 20-gauge needle was advanced into the mass and 3-1/2 cc of normal appearing cystic fluid was aspirated.  The cyst collapsed completely and this was sent for cytology.  Assessment/Plan   Problem List Items Addressed This Visit        Other    Abnormal mammogram     There is a minimally " complex cyst in the right breast which was aspirated to resolution.  If the cytology is benign, she can resume routine screening bilateral mammograms next July.         RESOLVED: Abnormal mammogram of left breast - Primary             Return If the cyst recurs or you have any abnormal breast imaging.    Desiree Spencer MD

## 2019-11-06 ENCOUNTER — TELEPHONE (OUTPATIENT)
Dept: SURGERY | Facility: CLINIC | Age: 44
End: 2019-11-06

## 2019-11-06 LAB
CASE RPRT: NORMAL
CLINICAL INFO: NORMAL
PATH REPORT.FINAL DX SPEC: NORMAL
PATH REPORT.GROSS SPEC: NORMAL

## 2019-11-06 PROCEDURE — 58300 INSERT INTRAUTERINE DEVICE: CPT | Performed by: OBSTETRICS & GYNECOLOGY

## 2019-11-06 NOTE — PROCEDURES
IUD Insertion Ambulatory  Date/Time: 11/6/2019 8:48 AM  Performed by: Nancy Garner MD  Authorized by: Nancy Garner MD     Consent:     Consent obtained:  Verbal    Consent given by:  Patient    Procedure risks and benefits discussed: yes      Patient questions answered: yes      Patient agrees, verbalizes understanding, and wants to proceed: yes    Procedure:     Pelvic exam performed: yes      Negative urine pregnancy test: yes      Cervix cleaned and prepped: yes      Speculum placed in vagina: yes      Tenaculum applied to cervix: yes      Uterus sounded: yes      Uterus sound depth (cm):  8    IUD inserted with no complications: yes      IUD type:  Mirena  Post-procedure:     Estimated blood loss: no blood loss

## 2019-11-07 LAB
DX ICD CODE: NORMAL
DX ICD CODE: NORMAL
PATH REPORT.FINAL DX SPEC: NORMAL
PATH REPORT.GROSS SPEC: NORMAL
PATH REPORT.SITE OF ORIGIN SPEC: NORMAL
PATHOLOGIST NAME: NORMAL
PAYMENT PROCEDURE: NORMAL

## 2019-11-13 PROCEDURE — 58300 INSERT INTRAUTERINE DEVICE: CPT | Performed by: OBSTETRICS & GYNECOLOGY

## 2019-11-13 PROCEDURE — 56605 BIOPSY OF VULVA/PERINEUM: CPT | Mod: XS | Performed by: OBSTETRICS & GYNECOLOGY

## 2019-11-13 NOTE — PROCEDURES
IUD Insertion Ambulatory  Date/Time: 11/13/2019 9:45 AM  Performed by: Nancy Garner MD  Authorized by: Nancy Garner MD     Consent:     Consent obtained:  Verbal and written    Consent given by:  Patient    Procedure risks and benefits discussed: yes      Patient questions answered: yes      Patient agrees, verbalizes understanding, and wants to proceed: yes    Procedure:     Pelvic exam performed: yes      Negative urine pregnancy test: yes      Cervix cleaned and prepped: yes      Speculum placed in vagina: yes      Tenaculum applied to cervix: yes      Uterus sounded: yes      Uterus sound depth (cm):  8    IUD inserted with no complications: yes      IUD type:  Mirena  Post-procedure:     Patient tolerated procedure well: yes      Patient will follow up after next period: yes      Estimated blood loss: minimal

## 2019-11-13 NOTE — PROCEDURES
Bx Vulva  Date/Time: 11/13/2019 9:44 AM  Performed by: Nancy Garner MD  Authorized by: Nancy Garner MD   Preparation: Patient was prepped and draped in the usual sterile fashion.  Local anesthesia used: yes    Anesthesia:  Local anesthesia used: yes  Local Anesthetic: lidocaine 2% with epinephrine  Patient tolerance: Patient tolerated the procedure well with no immediate complications  Comments: Lesion on right vulva removed with punch biopsy and scissors.  Hemostasis achieved with silver nitrate.

## 2019-12-03 NOTE — PROGRESS NOTES
Patient ID: Indu Pringle   : 1975  MRN: [id number]   Visit Date: 2019    Subjective   Indu Pringle is presenting today for IUD Check (String Check)    Mirena IUD placed 19  Had some minimal cramping and a little spotting for 2 weeks after insertion.      Vital Signs for this encounter:   Visit Vitals  /70   LMP  (LMP Unknown) Comment: Seasonique Use       Obstetric History:   OB History    Para Term  AB Living   3 3 3         SAB TAB Ectopic Multiple Live Births                  # Outcome Date GA Lbr Augustin/2nd Weight Sex Delivery Anes PTL Lv   3 Term            2 Term            1 Term               Obstetric Comments   3 FTSVDS   No gHTN/ gDM     Past Medical History:  has a past medical history of Arthritis, GERD without esophagitis, IBS (irritable bowel syndrome), and Migraines.  Past Surgical History:  has a past surgical history that includes Cholecystectomy and Tonsillectomy.  Family History: family history includes Arthritis in her biological mother; Hypertension in her biological mother; Lung cancer in her maternal grandfather; Melanoma in her biological mother and maternal grandfather.  Social History:  reports that she has quit smoking. She has a 11.25 pack-year smoking history. She has never used smokeless tobacco. She reports that she does not drink alcohol or use drugs.  Medications:   Current Outpatient Medications:   •  L norgest/e.estradiol-e.estrad (SEASONIQUE) 0.15 mg-30 mcg (84)/10 mcg (7) tablet, Take 1 tablet by mouth daily., Disp: 91 tablet, Rfl: 0    Allergies: is allergic to penicillins; sulfasalazine; and zinc.     HPI  Review of Systems  Physical Exam   Constitutional: She appears well-developed and well-nourished.   Genitourinary: Vagina normal and uterus normal.   External female genitalia normal.   Urethral meatus normal.   Urethra normal.   Normal bladder.   Vagina normal.   Cervix exam normal.  Cervix exhibits visible IUD strings.   Uterus  is normal.   Adnexa normal. Right adnexum does not display mass and does not display tenderness. Left adnexum does not display mass and does not display tenderness.   Cardiovascular: Normal rate, regular rhythm and normal heart sounds.   Pulmonary/Chest: Effort normal and breath sounds normal.   Abdominal: Soft. She exhibits no distension and no mass. There is no tenderness. There is no guarding.   Skin: No erythema.   Psychiatric: She has a normal mood and affect. Her behavior is normal.     Diagnoses and all orders for this visit:    Intrauterine device surveillance (Primary)      IUD strings in the correct position  Return for annual or PRN     The following have been marked reviewed and updated as appropriate in this visit:       No follow-ups on file.  Nancy Garner MD

## 2019-12-04 ENCOUNTER — OFFICE VISIT (OUTPATIENT)
Dept: OBSTETRICS AND GYNECOLOGY | Facility: CLINIC | Age: 44
End: 2019-12-04
Payer: COMMERCIAL

## 2019-12-04 VITALS — SYSTOLIC BLOOD PRESSURE: 128 MMHG | DIASTOLIC BLOOD PRESSURE: 70 MMHG

## 2019-12-04 DIAGNOSIS — Z30.431 INTRAUTERINE DEVICE SURVEILLANCE: Primary | ICD-10-CM

## 2019-12-04 PROCEDURE — 99213 OFFICE O/P EST LOW 20 MIN: CPT | Performed by: OBSTETRICS & GYNECOLOGY

## 2020-10-27 ENCOUNTER — TELEPHONE (OUTPATIENT)
Dept: PRIMARY CARE | Facility: CLINIC | Age: 45
End: 2020-10-27

## 2020-10-27 DIAGNOSIS — N64.4 BREAST PAIN, LEFT: ICD-10-CM

## 2020-10-27 DIAGNOSIS — R92.8 ABNORMAL MAMMOGRAM: Primary | ICD-10-CM

## 2020-10-27 NOTE — TELEPHONE ENCOUNTER
----- Message from Amy Morris DO sent at 10/27/2020 11:34 AM EDT -----  Regarding: FW: Referral Request  Contact: [phone]      ----- Message -----  From: Aarti Badillo MA  Sent: 10/27/2020   8:49 AM EDT  To: Amy Morris DO  Subject: FW: Referral Request                             The mammo last year was diagnostic right. Did you want to do a bilateral diagnostic?  ----- Message -----  From: Indu Pringle  Sent: 10/26/2020   8:14 PM EDT  To: [facility] Clinical Support [facility]  Subject: Referral Request                                 Mike Morris.      Can I get a referral for my annual mammogram?   I am having pain in my left breast when touched.   After last years abnormal mammogram,   I just want to be sure I am ok.   Thank you so much.

## 2020-10-29 ENCOUNTER — TELEPHONE (OUTPATIENT)
Dept: PRIMARY CARE | Facility: CLINIC | Age: 45
End: 2020-10-29

## 2020-10-29 RX ORDER — CYCLOBENZAPRINE HCL 10 MG
10 TABLET ORAL 3 TIMES DAILY PRN
Qty: 30 TABLET | Refills: 0 | Status: SHIPPED | OUTPATIENT
Start: 2020-10-29 | End: 2020-11-16 | Stop reason: SDUPTHER

## 2020-10-29 NOTE — TELEPHONE ENCOUNTER
Pt called and scheduled a Med check appt for November 3, but would like the med refill be sent to the pharmacy ASAP

## 2020-10-29 NOTE — TELEPHONE ENCOUNTER
Medicine Refill Request    Last Office Visit: 7/2/2019  Last Telemedicine Visit: Visit date not found    Next Office Visit: 11/3/2020  Next Telemedicine Visit: Visit date not found         Current Outpatient Medications:   •  cyclobenzaprine (FLEXERIL) 10 mg tablet, Take 1 tablet (10 mg total) by mouth 3 (three) times a day as needed for muscle spasms for up to 10 days., Disp: 30 tablet, Rfl: 0  •  L norgest/e.estradiol-e.estrad (SEASONIQUE) 0.15 mg-30 mcg (84)/10 mcg (7) tablet, Take 1 tablet by mouth daily., Disp: 91 tablet, Rfl: 0      BP Readings from Last 3 Encounters:   12/04/19 128/70   11/05/19 138/88   11/05/19 (!) 137/91       Recent Lab results:  Lab Results   Component Value Date    CHOL 173 05/30/2019   ,   Lab Results   Component Value Date    HDL 37 (L) 05/30/2019   ,   Lab Results   Component Value Date    LDLCALC 113 (H) 05/30/2019   ,   Lab Results   Component Value Date    TRIG 116 05/30/2019        Lab Results   Component Value Date    GLUCOSE 89 05/30/2019   ,   Lab Results   Component Value Date    HGBA1C 5.6 05/30/2019         Lab Results   Component Value Date    CREATININE 0.78 05/30/2019       Lab Results   Component Value Date    TSH 1.810 05/30/2019

## 2020-10-30 RX ORDER — METHYLPREDNISOLONE 4 MG/1
4 TABLET ORAL
Qty: 21 TABLET | Refills: 0 | Status: SHIPPED | OUTPATIENT
Start: 2020-10-30 | End: 2020-11-06

## 2020-10-30 NOTE — TELEPHONE ENCOUNTER
What are the symptoms she is having now?  Also not seen in past year so give appt within next week for eval on this and as well give Physical first avail

## 2020-10-30 NOTE — TELEPHONE ENCOUNTER
Ok -I sent steroid taper but if trouble and getting worse, change in bowel or bladder function or numbness in area of privates before our appt then should be seen in ER or UC

## 2020-10-30 NOTE — TELEPHONE ENCOUNTER
Patient calling to ask for a script called in for Prednizone. Patient states she has been in her bed for two days from her flare up. Patient states Dr Morris prescribed this before for her. Can she call this script in for her so she can get up from the bed. Pharmacy is [facility] in [place]. Patient's contact #[phone]

## 2020-10-30 NOTE — TELEPHONE ENCOUNTER
PT c/o sharp LBP shooting down buttock to the back of her left thigh. When she stands its worse and every time she takes a step its shooting down the back of her left leg. Hx of bulging discs in her back. Last flare was a year and a half ago with the same symptoms so believes the disc issue is causing sciatica. She is already scheduled on 11/3 at 11am.

## 2020-11-03 ENCOUNTER — OFFICE VISIT (OUTPATIENT)
Dept: PRIMARY CARE | Facility: CLINIC | Age: 45
End: 2020-11-03
Payer: COMMERCIAL

## 2020-11-03 VITALS
SYSTOLIC BLOOD PRESSURE: 124 MMHG | HEIGHT: 66 IN | OXYGEN SATURATION: 98 % | HEART RATE: 100 BPM | DIASTOLIC BLOOD PRESSURE: 86 MMHG | TEMPERATURE: 98 F | WEIGHT: 232.6 LBS | RESPIRATION RATE: 16 BRPM | BODY MASS INDEX: 37.38 KG/M2

## 2020-11-03 DIAGNOSIS — Z13.9 ENCOUNTER FOR SCREENING: ICD-10-CM

## 2020-11-03 DIAGNOSIS — M79.606 ACHING LEG SYNDROME, UNSPECIFIED LATERALITY: ICD-10-CM

## 2020-11-03 DIAGNOSIS — R79.89 ABNORMAL CBC: ICD-10-CM

## 2020-11-03 DIAGNOSIS — M54.42 ACUTE LEFT-SIDED LOW BACK PAIN WITH LEFT-SIDED SCIATICA: Primary | ICD-10-CM

## 2020-11-03 PROCEDURE — 99213 OFFICE O/P EST LOW 20 MIN: CPT | Performed by: FAMILY MEDICINE

## 2020-11-03 RX ORDER — TRAMADOL HYDROCHLORIDE 50 MG/1
50 TABLET ORAL EVERY 8 HOURS PRN
Qty: 30 TABLET | Refills: 0 | Status: SHIPPED | OUTPATIENT
Start: 2020-11-03 | End: 2020-11-13

## 2020-11-03 ASSESSMENT — ENCOUNTER SYMPTOMS
NUMBNESS: 0
DYSPHORIC MOOD: 0
WEAKNESS: 0
ROS GI COMMENTS: NO CHANGE IN BMS  NO GER
NAUSEA: 0
DIFFICULTY URINATING: 0
WHEEZING: 0
FREQUENCY: 0
MYALGIAS: 0
SHORTNESS OF BREATH: 0
ARTHRALGIAS: 0
UNEXPECTED WEIGHT CHANGE: 0
JOINT SWELLING: 0
DYSURIA: 0
FEVER: 0
SORE THROAT: 0
VOMITING: 0
COUGH: 0
ABDOMINAL PAIN: 0
DIARRHEA: 0
CONSTIPATION: 0
BACK PAIN: 1

## 2020-11-03 NOTE — PROGRESS NOTES
Amy Morris DO    [facility]  [address]  [place] [address]  Phone: [phone]  Fax: [phone]     History of Present Illness   Subjective     Patient ID: Indu Pringle is a 45 y.o. female.    Indu is here today for 6 dys ago when  got off couch she got a sharp pain in her L low back w radiation to L buttock so took Ibuprofen that she has continued at 400 mg q 8 hrs and started Flexeril TID and then started Medrol that she is  on 5th day of. No radiation past lat hip on L. No change BM or urine fxn. No numbness. So will add Tramadol prn for pain and start PT, back education and care reviewed.  Last seen 7/19  BP normal  BMI 37  Wt is up  Diet   - generally healthy  Exercise  - walks at work only but nothing steady so discussed starting short flat surface walks for back and start PT   Chronic Legs swelling at end of work day is stable  -compression stockings prn   Legs ache at night in bed that Flexeril helps with but not new w back pain, has been going on for a long time also  R side of low ribcage discomfort intermittently  ongoing for 3-4 yrs and is the same -saw GI for this w neg eval, and says flares w Ibuprofen use so a little flared now  FRANCISCO - not using anything so use Omeprazole while on  NSAIDs  Allergies - Zyrtec qd and prn  FLonase and is ok w this  Hx LBP for 15 yrs that occat flares and as above, PT done a long time ago  Hx knee OA - prn ibuprofen  TOB - smoked a total of 15 yrs.off TOB since ~5/18     HM-  -flu shot refuses but will consider  -Tdap 7/19   -Varicella had ds  -gyn/PAP 9/18 w Dr Garner and was normal - on OCP and menses light q 3 mos and getting hot flashes at last appt  -mammo not done - ordered again  -scope due at 50  -HIV screen ordered  -Hep C screen ordered  -BW 5/19 reviewed and 8/19 rechk CBC on low WBC so full labs ordered today  -Physical 7/19 so set up         Past Medical/Surgical/Family/Social History      The following have been reviewed and updated as appropriate in this visit:  Allergies  Meds  Problems         Past Medical History:   Diagnosis Date   • Arthritis     knees   • GERD without esophagitis    • IBS (irritable bowel syndrome)     w diarrhea   • Migraines     prior to or after periods, no aura        Past Surgical History:   Procedure Laterality Date   • CHOLECYSTECTOMY     • TONSILLECTOMY         Family History   Problem Relation Age of Onset   • Hypertension Biological Mother    • Arthritis Biological Mother    • Melanoma Biological Mother    • Lung cancer Maternal Grandfather    • Melanoma Maternal Grandfather    • Breast cancer Neg Hx    • Colon cancer Neg Hx    • Ovarian cancer Neg Hx        Social History     Tobacco Use   • Smoking status: Former Smoker     Packs/day: 0.75     Years: 15.00     Pack years: 11.25   • Smokeless tobacco: Never Used   • Tobacco comment: quit 5/2018   Substance Use Topics   • Alcohol use: No   • Drug use: No      Allergies and Medications     Allergies   Allergen Reactions   • Penicillins Hives   • Sulfasalazine    • Zinc Hives         Outpatient Encounter Medications as of 11/3/2020:   •  cyclobenzaprine (FLEXERIL) 10 mg tablet, Take 1 tablet (10 mg total) by mouth 3 (three) times a day as needed for muscle spasms for up to 10 days.  •  L norgest/e.estradiol-e.estrad (SEASONIQUE) 0.15 mg-30 mcg (84)/10 mcg (7) tablet, Take 1 tablet by mouth daily.  •  methylPREDNISolone (MEDROL DOSEPACK) 4 mg tablet, Take 1 tablet (4 mg total) by mouth with snacks (as directed) for up to 7 days. Follow package directions.  •  traMADoL (ULTRAM) 50 mg tablet, Take 1 tablet (50 mg total) by mouth every 8 (eight) hours as needed for moderate pain for up to 10 days.   Review of Systems       Review of Systems   Constitutional: Negative for fever and unexpected weight change.   HENT: Negative for ear pain and sore throat.         No URI   Respiratory: Negative for cough, shortness of  "breath and wheezing.    Cardiovascular: Negative for chest pain and leg swelling.   Gastrointestinal: Negative for abdominal pain, constipation, diarrhea, nausea and vomiting.        No change in Bms  No FRANCISCO   Endocrine: Negative for polyuria.   Genitourinary: Negative for difficulty urinating, dysuria, frequency, pelvic pain and urgency.   Musculoskeletal: Positive for back pain. Negative for arthralgias, gait problem, joint swelling and myalgias.   Skin: Negative for rash.   Neurological: Negative for weakness and numbness.   Psychiatric/Behavioral: Negative for behavioral problems and dysphoric mood.      Physical Examination       Objective     Vitals:    11/03/20 1104   BP: 124/86   Pulse: 100   Resp: 16   Temp: 36.7 °C (98 °F)   SpO2: 98%       Wt Readings from Last 3 Encounters:   11/03/20 106 kg (232 lb 9.6 oz)   11/05/19 95.3 kg (210 lb)   09/23/19 97.5 kg (215 lb)       Body mass index is 37.54 kg/m².    Ht Readings from Last 3 Encounters:   11/03/20 1.676 m (5' 6\")   11/05/19 1.676 m (5' 6\")   09/23/19 1.676 m (5' 6\")       BP Readings from Last 3 Encounters:   11/03/20 124/86   12/04/19 128/70   11/05/19 138/88       Physical Exam  Vitals signs and nursing note reviewed.   Constitutional:       General: She is not in acute distress.     Appearance: She is well-developed. She is obese. She is not ill-appearing.   HENT:      Head: Normocephalic and atraumatic.   Cardiovascular:      Pulses: Normal pulses.           Dorsalis pedis pulses are 2+ on the right side and 2+ on the left side.      Comments: No pedal edema  Musculoskeletal: Normal range of motion.         General: No swelling, tenderness, deformity or signs of injury.      Right shoulder: She exhibits normal range of motion, no bony tenderness, no swelling, no deformity and normal strength.      Right lower leg: No edema.      Left lower leg: No edema.      Comments: FROM spine but hurts most w back bending that limits ROM some  No spine or " muscular tenderness but indicates feels it in L paraspinal to buttock area  Also indicates feels chronic intermittent R lat ribcage pain w certain positions and also w NSAID use so may be in past musculoskeletal or in part GI so will observe for any change    Skin:     General: Skin is warm and dry.      Findings: No abrasion, bruising, erythema, petechiae or rash.   Neurological:      General: No focal deficit present.      Mental Status: She is alert and oriented to person, place, and time. Mental status is at baseline.      Sensory: No sensory deficit.      Motor: No weakness or abnormal muscle tone.      Coordination: Coordination normal.      Gait: Gait normal.      Deep Tendon Reflexes: Reflexes normal.      Reflex Scores:       Patellar reflexes are 2+ on the right side and 2+ on the left side.     Comments: SLR normal B/L LEs  Heel, toe,squat in tact   Psychiatric:         Mood and Affect: Mood normal.         Behavior: Behavior normal. Behavior is cooperative.      Comments: Pt is in NAD         Laboratory Results     Lab Results   Component Value Date    WBC 2.9 (L) 08/15/2019    HGB 12.8 08/15/2019    HCT 38.7 08/15/2019    MCV 92 08/15/2019     08/15/2019          Chemistry        Component Value Date/Time     05/30/2019 1207     02/26/2017 1854    K 4.4 05/30/2019 1207    K 3.9 02/26/2017 1854     (H) 05/30/2019 1207     02/26/2017 1854    CO2 18 (L) 05/30/2019 1207    CO2 20 (L) 02/26/2017 1854    BUN 13 05/30/2019 1207    BUN 19 02/26/2017 1854    CREATININE 0.78 05/30/2019 1207    CREATININE 0.6 02/26/2017 1854        Component Value Date/Time    CALCIUM 9.2 02/26/2017 1854    ALKPHOS 51 05/30/2019 1207    AST 18 05/30/2019 1207    ALT 26 05/30/2019 1207    BILITOT 0.4 05/30/2019 1207            Lab Results   Component Value Date    GLUCOSE 89 05/30/2019    CALCIUM 9.2 02/26/2017     05/30/2019    K 4.4 05/30/2019    CO2 18 (L) 05/30/2019     (H) 05/30/2019     BUN 13 05/30/2019    CREATININE 0.78 05/30/2019       Lab Results   Component Value Date    CHOL 173 05/30/2019     Lab Results   Component Value Date    HDL 37 (L) 05/30/2019     Lab Results   Component Value Date    LDLCALC 113 (H) 05/30/2019     Lab Results   Component Value Date    TRIG 116 05/30/2019     No results found for: CHOLHDL    Lab Results   Component Value Date    TSH 1.810 05/30/2019       Lab Results   Component Value Date    HGBA1C 5.6 05/30/2019       No results found for: HEPCAB      Immunization History   Administered Date(s) Administered   • Tdap 07/02/2019         Health Maintenance Topics with due status: Overdue       Topic Date Due    HIV Screening 07/14/1988    Hepatitis C Screening 07/14/1993     Health Maintenance Topics with due status: Postponed       Topic Postponed Until    Influenza Vaccine 11/03/2021 (Originally 8/1/2020)     Health Maintenance Topics with due status: Not Due       Topic Last Completion Date    Cervical Cancer Screening 09/19/2018    DTaP, Tdap, and Td Vaccines 07/02/2019     Health Maintenance Topics with due status: Aged Out       Topic Date Due    Meningococcal ACWY Aged Out    HIB Vaccines Aged Out    IPV Vaccines Aged Out    HPV Vaccines Aged Out    Pneumococcal Aged Out     Health Maintenance Topics with due status: Discontinued       Topic Date Due    Varicella Vaccines Discontinued          Assessment and Plan       Assessment/Plan     Problem List Items Addressed This Visit        Nervous    Acute left-sided low back pain with left-sided sciatica - Primary    Relevant Orders    Ambulatory referral to Physical Therapy    Aching leg syndrome    Relevant Orders    TSH w reflex FT4       Hematologic    Abnormal CBC    Current Assessment & Plan     See History of Present Illness section for assessment and plan on all medical Diagnoses         Relevant Orders    Comprehensive metabolic panel    Lipid panel    HIV 1,2 AB P24 AG    Hepatitis C antibody        Other    Encounter for screening    Relevant Orders    CBC    BI SCREENING MAMMOGRAM BILATERAL          Return for physical, prn.    Orders Placed This Encounter   Procedures   • BI SCREENING MAMMOGRAM BILATERAL     Standing Status:   Future     Standing Expiration Date:   1/3/2022     Order Specific Question:   Is the patient pregnant?     Answer:   No     Order Specific Question:   Release to patient     Answer:   Immediate   • Comprehensive metabolic panel     Standing Status:   Future     Number of Occurrences:   1     Standing Expiration Date:   11/3/2021     Order Specific Question:   Release to patient     Answer:   Immediate   • Lipid panel     Standing Status:   Future     Number of Occurrences:   1     Standing Expiration Date:   11/3/2021     Order Specific Question:   Release to patient     Answer:   Immediate   • CBC     Standing Status:   Future     Number of Occurrences:   1     Standing Expiration Date:   11/3/2021     Order Specific Question:   Release to patient     Answer:   Immediate   • HIV 1,2 AB P24 AG     Standing Status:   Future     Number of Occurrences:   1     Standing Expiration Date:   11/3/2021   • Hepatitis C antibody     Standing Status:   Future     Number of Occurrences:   1     Standing Expiration Date:   11/3/2021     Order Specific Question:   Release to patient     Answer:   Immediate   • TSH w reflex FT4     Standing Status:   Future     Number of Occurrences:   1     Standing Expiration Date:   11/3/2021     Order Specific Question:   Release to patient     Answer:   Immediate   • Ambulatory referral to Physical Therapy     Standing Status:   Future     Standing Expiration Date:   5/3/2021     Referral Priority:   Routine     Referral Type:   Physical Therapy     Referral Reason:   Specialty Services Required     Requested Specialty:   Physical Therapy     Number of Visits Requested:   10            Amy Morris DO  11/3/2020

## 2020-11-12 LAB
ALBUMIN SERPL-MCNC: 3.8 G/DL (ref 3.8–4.8)
ALBUMIN/GLOB SERPL: 1.3 {RATIO} (ref 1.2–2.2)
ALP SERPL-CCNC: 61 IU/L (ref 39–117)
ALT SERPL-CCNC: 9 IU/L (ref 0–32)
AST SERPL-CCNC: 15 IU/L (ref 0–40)
BILIRUB SERPL-MCNC: 0.3 MG/DL (ref 0–1.2)
BUN SERPL-MCNC: 12 MG/DL (ref 6–24)
BUN/CREAT SERPL: 18 (ref 9–23)
CALCIUM SERPL-MCNC: 9.1 MG/DL (ref 8.7–10.2)
CHLORIDE SERPL-SCNC: 104 MMOL/L (ref 96–106)
CHOLEST SERPL-MCNC: 186 MG/DL (ref 100–199)
CO2 SERPL-SCNC: 21 MMOL/L (ref 20–29)
CREAT SERPL-MCNC: 0.67 MG/DL (ref 0.57–1)
ERYTHROCYTE [DISTWIDTH] IN BLOOD BY AUTOMATED COUNT: 12.7 % (ref 11.7–15.4)
GLOBULIN SER CALC-MCNC: 3 G/DL (ref 1.5–4.5)
GLUCOSE SERPL-MCNC: 89 MG/DL (ref 65–99)
HCT VFR BLD AUTO: 38.3 % (ref 34–46.6)
HCV AB S/CO SERPL IA: <0.1 S/CO RATIO (ref 0–0.9)
HDLC SERPL-MCNC: 57 MG/DL
HGB BLD-MCNC: 13.2 G/DL (ref 11.1–15.9)
HIV 1+2 AB+HIV1 P24 AG SERPL QL IA: NON REACTIVE
LAB CORP EGFR IF AFRICN AM: 123 ML/MIN/1.73
LAB CORP EGFR IF NONAFRICN AM: 106 ML/MIN/1.73
LDLC SERPL CALC-MCNC: 110 MG/DL (ref 0–99)
MCH RBC QN AUTO: 31.7 PG (ref 26.6–33)
MCHC RBC AUTO-ENTMCNC: 34.5 G/DL (ref 31.5–35.7)
MCV RBC AUTO: 92 FL (ref 79–97)
PLATELET # BLD AUTO: 287 X10E3/UL (ref 150–450)
POTASSIUM SERPL-SCNC: 4.7 MMOL/L (ref 3.5–5.2)
PROT SERPL-MCNC: 6.8 G/DL (ref 6–8.5)
RBC # BLD AUTO: 4.17 X10E6/UL (ref 3.77–5.28)
SODIUM SERPL-SCNC: 138 MMOL/L (ref 134–144)
T4 FREE SERPL-MCNC: 1.04 NG/DL (ref 0.82–1.77)
TRIGL SERPL-MCNC: 107 MG/DL (ref 0–149)
TSH SERPL DL<=0.005 MIU/L-ACNC: 2.22 UIU/ML (ref 0.45–4.5)
VLDLC SERPL CALC-MCNC: 19 MG/DL (ref 5–40)
WBC # BLD AUTO: 4.2 X10E3/UL (ref 3.4–10.8)

## 2021-02-10 ENCOUNTER — TELEMEDICINE (OUTPATIENT)
Dept: PRIMARY CARE | Facility: CLINIC | Age: 46
End: 2021-02-10
Payer: COMMERCIAL

## 2021-02-10 DIAGNOSIS — J30.2 SEASONAL ALLERGIC RHINITIS, UNSPECIFIED TRIGGER: ICD-10-CM

## 2021-02-10 DIAGNOSIS — K58.0 IRRITABLE BOWEL SYNDROME WITH DIARRHEA: ICD-10-CM

## 2021-02-10 DIAGNOSIS — J02.9 PHARYNGITIS, UNSPECIFIED ETIOLOGY: Primary | ICD-10-CM

## 2021-02-10 DIAGNOSIS — K21.9 GERD WITHOUT ESOPHAGITIS: ICD-10-CM

## 2021-02-10 PROCEDURE — 99214 OFFICE O/P EST MOD 30 MIN: CPT | Mod: 95 | Performed by: FAMILY MEDICINE

## 2021-02-10 RX ORDER — AZITHROMYCIN 250 MG/1
TABLET, FILM COATED ORAL
Qty: 6 TABLET | Refills: 0 | Status: SHIPPED | OUTPATIENT
Start: 2021-02-10 | End: 2021-04-05

## 2021-02-10 ASSESSMENT — ENCOUNTER SYMPTOMS
EYE DISCHARGE: 0
DYSURIA: 0
HEADACHES: 0
ABDOMINAL PAIN: 0
MYALGIAS: 0
ACTIVITY CHANGE: 0
FATIGUE: 0
AGITATION: 0
ADENOPATHY: 0
FEVER: 1
CONFUSION: 0
PALPITATIONS: 0
COUGH: 0
APPETITE CHANGE: 0
DIZZINESS: 0
BACK PAIN: 0
SORE THROAT: 1
TROUBLE SWALLOWING: 0
CHEST TIGHTNESS: 0
BLOOD IN STOOL: 0
ARTHRALGIAS: 0
NAUSEA: 0
EYE PAIN: 0
EYE ITCHING: 0
COLOR CHANGE: 0
SHORTNESS OF BREATH: 0
SINUS PRESSURE: 0
VOMITING: 0
SINUS PAIN: 0
DIARRHEA: 0

## 2021-02-10 NOTE — PATIENT INSTRUCTIONS
It was nice to see you today.  Feel better and Get well soon.  Take all the medications as Presribed and stay safe, stay active, stay well hydrated.    Discussed the importance of social distancing-Avoid large crowds-Stay at home if possible, Self monitor temp matthew 12 hours. Wash hands diligently, avoid contact with face from hands, cough/sneeze into the elbow, etc. Pt understands these universal precautions and current guidelines and will adhere to them. Pt knows to call us with any increase in symptoms such as SOB/PINO, High Fevers etc.

## 2021-02-10 NOTE — ASSESSMENT & PLAN NOTE
"With Hx (see HPI) Suspect bacterial URI-Pharyngitis.  Rest, stay well hydrated and start Antibx  Sent Z-zeeshan    Get well soon!    I spent 30 minutes on this date of service performing the following activities:Preparing for the visit, obtaining and reviewing records, independently reviewing studies, obtaining a history and performing an examination via \"Zoom Call\", documenting, entering orders, providing counseling and eduction, communicating and discussing the results with the pt, and overall care coordination.     "

## 2021-02-10 NOTE — PROGRESS NOTES
Hardeep Lopez DO, FACSG  [facility]  Family Medicine  [address], [address]  [place] [address]  Phone: [phone]  Fax: [phone]     [facility] VIDEO-TELEMEDICINE ENCOUNTER  Consent/History of Present Illness/Chief Complaint     Indu Pringle is a 45 y.o. female (established patient at [facility]). This patient is participating in a telemedicine encounter due to inability or contraindication(s) to present to the office in-person. This telemedicine visit was audio and video.    The patient was informed that the video conference is/was a live consult between myself and the patient, no other parties were present in the room, and the conference was not and should not be recorded by either parties. In addition, the patient was informed that the connections via Skype/PowerStores/Zoom may not be secure or private. The patient verbalized an understanding of this and consented to this appointment.     This video-telemedicine visit will be billed to the patient's health insurance or to the patient directly (if this individual is self-insured). The patient verbalized an understanding that he/she will be responsible for any co-payments, deductibles, or co-insurances that apply to this telemedicine visit. This visit was not related to any in-person evaluation or appointment within the last seven days. In addition, the patient does not have an upcoming appointment with any of our providers within the next 24 hours.The patient has verbalized an understanding of the above, and has verbally consented to this virtual office visit. Time spent during this virtual encounter totaled: 28-30 minutes.     Pt contacted for a TELEMED visit during the Covid 19 Pandemic.  Presents to have all medical conditions assessed and evaluated today.  • She has recently  Been dealing with a sore throat and swollen glands. Feeling lousy. Had a Covid Test whish was negative.  Has been dealing with Symptoms about 1 week, started  "as \"scratchy throat\" then acquired a fever, got worse, then was tested and was Negative for Covid...    Active/Chronic Medical Issues:    --Seasonal allergies // Allergic Rhinitis  Baseline  Stable    --IBS  Stable  avoidt triggers  Stress less    --GERD  Avoid triggers  Baseline  Stable    --Overweight  Continue to eat healthy  Stay active    • See A/P  Discussed the importance of social distancing-Avoid large crowds-Stay at home if possible, Self monitor temp matthew 12 hours. Wash hands diligently, avoid contact with face from hands, cough/sneeze into the elbow, etc. Pt understands these universal precautions and current guidelines and will adhere to them. Pt knows to call us with any increase in symptoms such as SOB/PINO, High Fevers etc.      • I spent 30 minutes on this date of service performing the following activities:Preparing for the visit, obtaining and reviewing records, independently reviewing studies, obtaining a history and performing an examination, documenting, entering orders, providing counseling and eduction, communicating and discussing the results with the pt, and overall care coordination.          Past Medical Hx - Surgical Hx - Family Hx - Social Hx     The following have been reviewed and updated as appropriate in this visit:         Past Medical History:   Diagnosis Date   • Arthritis     knees   • GERD without esophagitis    • IBS (irritable bowel syndrome)     w diarrhea   • Migraines     prior to or after periods, no aura        Past Surgical History:   Procedure Laterality Date   • CHOLECYSTECTOMY     • TONSILLECTOMY         Family History   Problem Relation Age of Onset   • Hypertension Biological Mother    • Arthritis Biological Mother    • Melanoma Biological Mother    • Lung cancer Maternal Grandfather    • Melanoma Maternal Grandfather    • Breast cancer Neg Hx    • Colon cancer Neg Hx    • Ovarian cancer Neg Hx        Social History     Socioeconomic History   • Marital status: " Single     Spouse name: Not on file   • Number of children: 3   • Years of education: Not on file   • Highest education level: Not on file   Occupational History   • Occupation: manages [employer]   Social Needs   • Financial resource strain: Not on file   • Food insecurity     Worry: Not on file     Inability: Not on file   • Transportation needs     Medical: Not on file     Non-medical: Not on file   Tobacco Use   • Smoking status: Former Smoker     Packs/day: 0.75     Years: 15.00     Pack years: 11.25   • Smokeless tobacco: Never Used   • Tobacco comment: quit 5/2018   Substance and Sexual Activity   • Alcohol use: No   • Drug use: No   • Sexual activity: Not on file   Lifestyle   • Physical activity     Days per week: Not on file     Minutes per session: Not on file   • Stress: Not on file   Relationships   • Social connections     Talks on phone: Not on file     Gets together: Not on file     Attends Evangelical service: Not on file     Active member of club or organization: Not on file     Attends meetings of clubs or organizations: Not on file     Relationship status: Not on file   • Intimate partner violence     Fear of current or ex partner: Not on file     Emotionally abused: Not on file     Physically abused: Not on file     Forced sexual activity: Not on file   Other Topics Concern   • Not on file   Social History Narrative   • Not on file        Allergies & Medications     Allergies   Allergen Reactions   • Penicillins Hives   • Sulfasalazine    • Zinc Hives       Current Outpatient Medications   Medication Sig Dispense Refill   • azithromycin (ZITHROMAX) 250 mg tablet Take 2 tablets the first day, then 1 tablet daily for 4 days. 6 tablet 0   • cyclobenzaprine (FLEXERIL) 10 mg tablet Take 1 tablet (10 mg total) by mouth 3 (three) times a day as needed for muscle spasms for up to 10 days. 30 tablet 0   • L norgest/e.estradiol-e.estrad (SEASONIQUE) 0.15 mg-30 mcg (84)/10 mcg (7) tablet Take 1 tablet by  mouth daily. 91 tablet 0     No current facility-administered medications for this visit.         Laboratory Results     Lab Results   Component Value Date    WBC 4.2 11/11/2020    HGB 13.2 11/11/2020    HCT 38.3 11/11/2020     11/11/2020        Lab Results   Component Value Date    GLUCOSE 89 11/11/2020    CALCIUM 9.2 02/26/2017     11/11/2020    K 4.7 11/11/2020    CO2 21 11/11/2020     11/11/2020    BUN 12 11/11/2020    CREATININE 0.67 11/11/2020    ALKPHOS 61 11/11/2020    AST 15 11/11/2020    ALT 9 11/11/2020    BILITOT 0.3 11/11/2020    ALBUMIN 3.8 11/11/2020       Lab Results   Component Value Date    CHOL 186 11/11/2020     Lab Results   Component Value Date    HDL 57 11/11/2020     Lab Results   Component Value Date    LDLCALC 110 (H) 11/11/2020     Lab Results   Component Value Date    TRIG 107 11/11/2020       Lab Results   Component Value Date    TSH 2.220 11/11/2020       Lab Results   Component Value Date    HGBA1C 5.6 05/30/2019       Lab Results   Component Value Date    HEPCAB <0.1 11/11/2020       Immunization History   Administered Date(s) Administered   • Tdap 07/02/2019       Health Maintenance Topics with due status: Postponed       Topic Postponed Until    Influenza Vaccine 11/03/2021 (Originally 8/1/2020)     Health Maintenance Topics with due status: Not Due       Topic Last Completion Date    Cervical Cancer Screening 09/19/2018    DTaP, Tdap, and Td Vaccines 07/02/2019     Health Maintenance Topics with due status: Completed       Topic Last Completion Date    HIV Screening 11/11/2020    Hepatitis C Screening 11/11/2020     Health Maintenance Topics with due status: Aged Out       Topic Date Due    Meningococcal ACWY Aged Out    HIB Vaccines Aged Out    IPV Vaccines Aged Out    HPV Vaccines Aged Out    Pneumococcal Aged Out     Health Maintenance Topics with due status: Discontinued       Topic Date Due    Varicella Vaccines Discontinued      Review of Systems     Review  "of Systems   Constitutional: Positive for fever. Negative for activity change, appetite change and fatigue.   HENT: Positive for congestion and sore throat. Negative for sinus pressure, sinus pain and trouble swallowing.    Eyes: Negative for pain, discharge, itching and visual disturbance.   Respiratory: Negative for cough, chest tightness and shortness of breath.    Cardiovascular: Negative for chest pain, palpitations and leg swelling.   Gastrointestinal: Negative for abdominal pain, blood in stool, diarrhea, nausea and vomiting.   Endocrine: Negative for cold intolerance and heat intolerance.   Genitourinary: Negative for dysuria.   Musculoskeletal: Negative for arthralgias, back pain, gait problem and myalgias.   Skin: Negative for color change and rash.   Allergic/Immunologic: Negative for environmental allergies and food allergies.   Neurological: Negative for dizziness and headaches.   Hematological: Negative for adenopathy.   Psychiatric/Behavioral: Negative for agitation, behavioral problems and confusion.       Assessment and Plan     Assessment/Plan     Problem List Items Addressed This Visit        Respiratory    Pharyngitis - Primary    Current Assessment & Plan     With Hx (see HPI) Suspect bacterial URI-Pharyngitis.  Rest, stay well hydrated and start Antibx  Sent Z-zeeshan    Get well soon!    I spent 30 minutes on this date of service performing the following activities:Preparing for the visit, obtaining and reviewing records, independently reviewing studies, obtaining a history and performing an examination via \"Zoom Call\", documenting, entering orders, providing counseling and eduction, communicating and discussing the results with the pt, and overall care coordination.            Seasonal allergic rhinitis    Current Assessment & Plan     Stable  Baseline  Stay well hydrated            Digestive    IBS (irritable bowel syndrome)    Overview     w diarrhea         Current Assessment & Plan     " Baseline  Avoid triggers  Stable           GERD without esophagitis    Current Assessment & Plan     Avoid triggers  Baseline  Stable  Meds as needed            Other    BMI 37.0-37.9, adult    Current Assessment & Plan     Stable  Diet and Exercise  Eat healthy  Reinforce - weight loss healthy over time                 Time spent during this virtual encounter totaled: 28-30 minutes.   67758: 5-10  96031: 11-20  04409: 21-30            Hardeep Lopez DO, FACSG    2/10/2021

## 2021-02-19 ENCOUNTER — HOSPITAL ENCOUNTER (OUTPATIENT)
Dept: RADIOLOGY | Age: 46
Discharge: HOME | End: 2021-02-19
Attending: FAMILY MEDICINE
Payer: COMMERCIAL

## 2021-02-19 ENCOUNTER — DOCUMENTATION (OUTPATIENT)
Dept: RADIOLOGY | Age: 46
End: 2021-02-19

## 2021-02-19 ENCOUNTER — HOSPITAL ENCOUNTER (OUTPATIENT)
Dept: RADIOLOGY | Age: 46
Discharge: HOME | End: 2021-02-19
Attending: RADIOLOGY
Payer: COMMERCIAL

## 2021-02-19 DIAGNOSIS — R92.8 ABNORMAL MAMMOGRAM: ICD-10-CM

## 2021-02-19 DIAGNOSIS — N64.4 BREAST PAIN, LEFT: ICD-10-CM

## 2021-02-19 PROCEDURE — G0279 TOMOSYNTHESIS, MAMMO: HCPCS

## 2021-02-19 PROCEDURE — 76642 ULTRASOUND BREAST LIMITED: CPT | Mod: LT

## 2021-02-19 NOTE — PROGRESS NOTES
Met with Indu after abnormal imaging. Reviewed breast abnormalities. Indu saw Dr. Spencer in the past, and wants to follow up with her. Notified the navigators at [facility] to help schedule.

## 2021-02-22 ENCOUNTER — TELEPHONE (OUTPATIENT)
Dept: RADIOLOGY | Facility: HOSPITAL | Age: 46
End: 2021-02-22

## 2021-02-22 ENCOUNTER — TELEPHONE (OUTPATIENT)
Dept: SURGERY | Facility: CLINIC | Age: 46
End: 2021-02-22

## 2021-02-22 DIAGNOSIS — R92.8 ABNORMAL ULTRASOUND OF BREAST: Primary | ICD-10-CM

## 2021-02-22 NOTE — TELEPHONE ENCOUNTER
Check in call with patient since abnormal imaging, advised office working on an appointment for her No questions at this time, appreciated the call.

## 2021-03-08 ENCOUNTER — HOSPITAL ENCOUNTER (OUTPATIENT)
Dept: RADIOLOGY | Facility: HOSPITAL | Age: 46
Discharge: HOME | End: 2021-03-08
Attending: RADIOLOGY
Payer: COMMERCIAL

## 2021-03-08 ENCOUNTER — HOSPITAL ENCOUNTER (OUTPATIENT)
Dept: RADIOLOGY | Facility: HOSPITAL | Age: 46
Discharge: HOME | End: 2021-03-08
Attending: SURGERY
Payer: COMMERCIAL

## 2021-03-08 DIAGNOSIS — R92.8 ABNORMAL ULTRASOUND OF BREAST: ICD-10-CM

## 2021-03-08 PROCEDURE — 77065 DX MAMMO INCL CAD UNI: CPT | Mod: LT

## 2021-03-08 PROCEDURE — BH41ZZZ ULTRASONOGRAPHY OF LEFT BREAST: ICD-10-PCS | Performed by: RADIOLOGY

## 2021-03-08 PROCEDURE — 88341 IMHCHEM/IMCYTCHM EA ADD ANTB: CPT | Performed by: SURGERY

## 2021-03-08 PROCEDURE — 0HBU3ZX EXCISION OF LEFT BREAST, PERCUTANEOUS APPROACH, DIAGNOSTIC: ICD-10-PCS | Performed by: RADIOLOGY

## 2021-03-08 PROCEDURE — 25000000 HC PHARMACY GENERAL: Performed by: RADIOLOGY

## 2021-03-08 PROCEDURE — 36100345 US GUIDED BREAST BIOPSY LEFT

## 2021-03-08 RX ORDER — LIDOCAINE HYDROCHLORIDE AND EPINEPHRINE 10; 10 UG/ML; MG/ML
0-30 INJECTION, SOLUTION INFILTRATION; PERINEURAL ONCE
Status: COMPLETED | OUTPATIENT
Start: 2021-03-08 | End: 2021-03-08

## 2021-03-08 RX ORDER — LIDOCAINE HYDROCHLORIDE 10 MG/ML
0-10 INJECTION, SOLUTION EPIDURAL; INFILTRATION; INTRACAUDAL; PERINEURAL ONCE
Status: COMPLETED | OUTPATIENT
Start: 2021-03-08 | End: 2021-03-08

## 2021-03-08 RX ADMIN — LIDOCAINE HYDROCHLORIDE AND EPINEPHRINE 15 ML: 10; 10 INJECTION, SOLUTION INFILTRATION; PERINEURAL at 10:06

## 2021-03-08 RX ADMIN — LIDOCAINE HYDROCHLORIDE 3 ML: 10 INJECTION, SOLUTION EPIDURAL; INFILTRATION; INTRACAUDAL; PERINEURAL at 10:06

## 2021-03-08 NOTE — POST-PROCEDURE NOTE
Immediate Breast Interventional Postprocedure Note    Indu Pringle     Attending: Lauryn Cosme D.O.    Assistant: technologist      Diagnosis: Breast Abnormality    Description of procedure: Imaging Guided Percutaneous Breast Biopsy     Laterality: Left    Anesthesia:  Local    Findings: Breast Abnormality    Complications: None    Estimated Blood Loss: Less than 100 ml    Specimens: Breast Tissue      3/8/2021 10:07 AM

## 2021-03-10 ENCOUNTER — TELEPHONE (OUTPATIENT)
Dept: PRIMARY CARE | Facility: CLINIC | Age: 46
End: 2021-03-10

## 2021-03-10 ENCOUNTER — TELEPHONE (OUTPATIENT)
Dept: SURGERY | Facility: CLINIC | Age: 46
End: 2021-03-10

## 2021-03-10 LAB
CASE RPRT: NORMAL
CLINICAL INFO: NORMAL
IMMUNE STAIN STUDY: NORMAL
PATH REPORT.FINAL DX SPEC: NORMAL
PATH REPORT.FINAL DX SPEC: NORMAL
PATH REPORT.GROSS SPEC: NORMAL

## 2021-03-10 NOTE — TELEPHONE ENCOUNTER
Patient calling because she had a needle biopsy done on monday and woke up today with a rash all over her breast and armpit causing irritation. Patient says she took no medication

## 2021-03-10 NOTE — TELEPHONE ENCOUNTER
Phone triage call  Eboni requests that I triage pt's symptoms, after pt called in statimg redness, swelling and rash on of her left breast where she got a needle biopsy on Monday and Surgeon not getting back to her.    Called ans spoke with pt who stated thatmariam got a needle biopsy of her left breast on Monday and was fine after, then this morning she developed red patchy areas, like a rash, all over her left breast that goes under her arm pit and is very itchy; denied any: swelling, pain (just has a little soreness @ the biopsy site) or drainage at the biopsy site; denied using any new products at all that could have caused a rash; denied any red patchy areas or rash on right breast or anywhere else. Pt emailed her surgeon's (Dr Desiree Spencer) office early this morning and then called that office back at 10 AM and 2 PM and still hasn't heard back from the surgeon; the pt stated that the person at 2 PM stated that Dr Spencer is doing clinicals all day. Pt didn'tknow what else to do, so wants to know what Dr Morris thinks she should do. Pt's Android cell # [phone]    Routed to Dr Morris: please advise: advice or UC/ ED?

## 2021-03-10 NOTE — TELEPHONE ENCOUNTER
Amy is aware that the biopsy is benign and requires no further intervention.  She does report a red pruritic rash over the entire breast from the biopsy site all the way out towards the axilla.  It is uncertain whether this is a reaction to one of the cleaning products, he ChloraPrep or maybe even lidocaine.  I advised her to put hydrocortisone cream on this as well as consider Benadryl for the itch cream.  If this worsens, we may need to consider a short course of steroids.  She will send me a picture through the Internet.

## 2021-03-11 ENCOUNTER — TELEPHONE (OUTPATIENT)
Dept: SURGERY | Facility: CLINIC | Age: 46
End: 2021-03-11

## 2021-03-11 NOTE — TELEPHONE ENCOUNTER
Can you call Tj’s office to see if the want to see Pt or speak w her re potential nxt site infxn and if not put in today e us for SDS appt to chk it please

## 2021-03-11 NOTE — TELEPHONE ENCOUNTER
Called Dr Spencer's office, # [phone], (on personal cell as office phones are down) and spoke with Kareen, and let her know that the pt tried 3 times yesterday to get in touch with Dr Spencer re: breast needle biopsy site issues, but no return calls and Kareen stated that Dr Spencer is at the [place] office and she will contact her there, to call the pt. I let her know that Dr Morris stated that she will see the pt today, if Dr Spencer does not. Kareen stated that she will have pt call Dr Morris's office if Dr Spencer does not contact the pt.    Routed to Dr Morris as an FYI update.

## 2021-03-11 NOTE — TELEPHONE ENCOUNTER
I checked pt's record and didn't see a response from Dr Spencer, to notes her staff sent her, so I routed the notes again to Dr Spencer. Since I again did not see Dr Spencer response in record, I called the pt who told me that Dr Spencer actually got in touch with her last evening and stated that it may be an allergic reaction to possibly Lidocaine or cleaning solution used during the procedure and told her to apply Cortisone cream. Pt expressed appreciation for reaching out to her and making sure that she got to speak with Dr Spencer.

## 2021-04-05 ENCOUNTER — OFFICE VISIT (OUTPATIENT)
Dept: PRIMARY CARE | Facility: CLINIC | Age: 46
End: 2021-04-05
Payer: COMMERCIAL

## 2021-04-05 VITALS
HEART RATE: 100 BPM | BODY MASS INDEX: 37.45 KG/M2 | SYSTOLIC BLOOD PRESSURE: 138 MMHG | HEIGHT: 66 IN | RESPIRATION RATE: 16 BRPM | OXYGEN SATURATION: 97 % | WEIGHT: 233 LBS | DIASTOLIC BLOOD PRESSURE: 84 MMHG

## 2021-04-05 DIAGNOSIS — G89.29 CHRONIC CHEST WALL PAIN: ICD-10-CM

## 2021-04-05 DIAGNOSIS — Z00.00 ENCOUNTER FOR GENERAL ADULT MEDICAL EXAMINATION WITHOUT ABNORMAL FINDINGS: Primary | ICD-10-CM

## 2021-04-05 DIAGNOSIS — R07.89 CHRONIC CHEST WALL PAIN: ICD-10-CM

## 2021-04-05 PROBLEM — M54.42 ACUTE LEFT-SIDED LOW BACK PAIN WITH LEFT-SIDED SCIATICA: Status: RESOLVED | Noted: 2019-02-19 | Resolved: 2021-04-05

## 2021-04-05 PROCEDURE — 99396 PREV VISIT EST AGE 40-64: CPT | Performed by: FAMILY MEDICINE

## 2021-04-05 PROCEDURE — 3008F BODY MASS INDEX DOCD: CPT | Performed by: FAMILY MEDICINE

## 2021-04-05 ASSESSMENT — ENCOUNTER SYMPTOMS
EYE DISCHARGE: 0
NERVOUS/ANXIOUS: 0
WHEEZING: 0
DYSPHORIC MOOD: 0
APPETITE CHANGE: 0
UNEXPECTED WEIGHT CHANGE: 0
ADENOPATHY: 0
NAUSEA: 0
HEADACHES: 0
VOMITING: 0
FREQUENCY: 0
ABDOMINAL PAIN: 0
SHORTNESS OF BREATH: 0
DIARRHEA: 0
DYSURIA: 0
CONSTIPATION: 0
SEIZURES: 0
ARTHRALGIAS: 0
FEVER: 0
COUGH: 0
SLEEP DISTURBANCE: 0
SORE THROAT: 0
RHINORRHEA: 0
BLOOD IN STOOL: 0
FATIGUE: 0
WEAKNESS: 0

## 2021-04-05 NOTE — PROGRESS NOTES
Amy Morris DO    [facility]  [address]  [place] [address]  Phone: [phone]  Fax: [phone]     History of Present Illness   Subjective     Patient ID: Indu Pringle is a 45 y.o. female.    Indu is here today for a Physical  Last seen 11/2020 for L LBP that is not a concern today  BP normal  BMI 37  Wt similar to last  Diet   - 3 meals but says a lot of sweets, gets basic foods though  Exercise  - just active at work so discussed goals/options  Eye exam w glasses UTD  Dentist UTD  Chronic Legs swelling at end of work day is stable  -compression stockings prn and is ok and soft today  Legs ache at night in bed that Flexeril helps prn  R side of low ribcage discomfort intermittently for 3-4 yrs and is the same -saw GI for this w neg eval in past and this flared postitionally in past day so can use heat, advil prn  FRANCISCO - not using anything so just use Omeprazole if on  NSAIDs  Allergies on Zyrtec qd and prn  FLonase are stable  Hx LBP for 15 yrs that occat flares and as above, PT done a long time ago  Hx knee OA - prn ibuprofen  TOB - smoked a total of 15 yrs.off TOB since ~5/18     HM-  -flu shot refused  -Tdap 7/19   -Varicella had ds  -Shingrix due at 50  -COVID vaccines when avail  -gyn/PAP 9/18 w Dr Garner and was normal - Merena IUD placed 2019 and no menses on it since fall 2020 so FU w gyn this year  -mammo 3/21 then just had breast bx that was negative so chk when nxt mammo due  -scope due at 50  -HIV screen neg 11/2020  -Hep C screen neg 11/2020  -BW 11/2020 reviewed  -Physical 4/21 today         Past Medical/Surgical/Family/Social History     The following have been reviewed and updated as appropriate in this visit:  Allergies  Meds  Problems         Past Medical History:   Diagnosis Date   • Arthritis     knees   • GERD without esophagitis    • IBS (irritable bowel syndrome)     w diarrhea   • Migraines     prior to or after  periods, no aura        Past Surgical History:   Procedure Laterality Date   • BREAST BIOPSY Left     3/21 neg   • CHOLECYSTECTOMY     • TONSILLECTOMY         Family History   Problem Relation Age of Onset   • Hypertension Biological Mother    • Arthritis Biological Mother    • Melanoma Biological Mother    • Lung cancer Maternal Grandfather    • Melanoma Maternal Grandfather    • Breast cancer Neg Hx    • Colon cancer Neg Hx    • Ovarian cancer Neg Hx        Social History     Tobacco Use   • Smoking status: Former Smoker     Packs/day: 0.75     Years: 15.00     Pack years: 11.25   • Smokeless tobacco: Never Used   • Tobacco comment: quit 5/2018   Substance Use Topics   • Alcohol use: No   • Drug use: No      Allergies and Medications     Allergies   Allergen Reactions   • Penicillins Hives   • Sulfasalazine    • Zinc Hives         Outpatient Encounter Medications as of 4/5/2021:   •  cyclobenzaprine (FLEXERIL) 10 mg tablet, Take 1 tablet (10 mg total) by mouth 3 (three) times a day as needed for muscle spasms for up to 10 days.  •  [DISCONTINUED] azithromycin (ZITHROMAX) 250 mg tablet, Take 2 tablets the first day, then 1 tablet daily for 4 days.  •  [DISCONTINUED] L norgest/e.estradiol-e.estrad (SEASONIQUE) 0.15 mg-30 mcg (84)/10 mcg (7) tablet, Take 1 tablet by mouth daily.   Review of Systems       Review of Systems   Constitutional: Negative for appetite change, fatigue, fever and unexpected weight change.   HENT: Negative for congestion, ear pain, hearing loss, rhinorrhea and sore throat.    Eyes: Negative for discharge and visual disturbance.   Respiratory: Negative for cough, shortness of breath and wheezing.    Cardiovascular: Negative for chest pain and leg swelling.   Gastrointestinal: Negative for abdominal pain, blood in stool, constipation, diarrhea, nausea and vomiting.   Endocrine: Negative for polyuria.   Genitourinary: Negative for decreased urine volume, dysuria, frequency, urgency and vaginal  "discharge.        No menses w IUD since fall   Musculoskeletal: Negative for arthralgias.   Skin: Negative for rash.   Allergic/Immunologic: Negative for environmental allergies.   Neurological: Negative for seizures, weakness and headaches.   Hematological: Negative for adenopathy.   Psychiatric/Behavioral: Negative for behavioral problems, dysphoric mood and sleep disturbance. The patient is not nervous/anxious.       Physical Examination       Objective     Vitals:    04/05/21 1304   BP: 138/84   Pulse: 100   Resp: 16   SpO2: 97%       Wt Readings from Last 3 Encounters:   04/05/21 106 kg (233 lb)   11/03/20 106 kg (232 lb 9.6 oz)   11/05/19 95.3 kg (210 lb)       Body mass index is 37.61 kg/m².    Ht Readings from Last 3 Encounters:   04/05/21 1.676 m (5' 6\")   11/03/20 1.676 m (5' 6\")   11/05/19 1.676 m (5' 6\")       BP Readings from Last 3 Encounters:   04/05/21 138/84   11/03/20 124/86   12/04/19 128/70       Physical Exam  Vitals signs and nursing note reviewed.   Constitutional:       General: She is not in acute distress.     Appearance: Normal appearance. She is well-developed. She is obese. She is not ill-appearing.   HENT:      Head: Normocephalic and atraumatic.      Right Ear: Tympanic membrane and ear canal normal.      Left Ear: Tympanic membrane and ear canal normal.      Nose: Nose normal.      Mouth/Throat:      Mouth: Mucous membranes are moist.   Eyes:      General: Lids are normal.      Conjunctiva/sclera: Conjunctivae normal.      Pupils: Pupils are equal, round, and reactive to light.   Neck:      Musculoskeletal: Neck supple.      Thyroid: No thyromegaly.      Trachea: Trachea normal.   Cardiovascular:      Rate and Rhythm: Normal rate and regular rhythm.      Pulses:           Dorsalis pedis pulses are 2+ on the right side and 2+ on the left side.      Heart sounds: Normal heart sounds.   Pulmonary:      Effort: Pulmonary effort is normal. No respiratory distress.      Breath sounds: " Normal breath sounds. No wheezing.   Abdominal:      General: Bowel sounds are normal.      Palpations: Abdomen is soft.      Tenderness: There is no abdominal tenderness.   Musculoskeletal: Normal range of motion.      Right lower leg: No edema.      Left lower leg: No edema.      Comments: Ankles large but soft  Tender over low R anterolat ribcage area but not localized and notes it more w laying back or sitting up or leaning to side   Lymphadenopathy:      Cervical: No cervical adenopathy.   Skin:     General: Skin is warm and dry.      Findings: No bruising, erythema or rash.   Neurological:      Mental Status: She is alert and oriented to person, place, and time. Mental status is at baseline.      Motor: No abnormal muscle tone.      Coordination: Coordination normal.      Comments: Normal strength and ROM in extrems   Psychiatric:         Mood and Affect: Mood normal.         Behavior: Behavior normal. Behavior is cooperative.        Laboratory Results     Lab Results   Component Value Date    WBC 4.2 11/11/2020    HGB 13.2 11/11/2020    HCT 38.3 11/11/2020    MCV 92 11/11/2020     11/11/2020          Chemistry        Component Value Date/Time     11/11/2020 1311     02/26/2017 1854    K 4.7 11/11/2020 1311    K 3.9 02/26/2017 1854     11/11/2020 1311     02/26/2017 1854    CO2 21 11/11/2020 1311    CO2 20 (L) 02/26/2017 1854    BUN 12 11/11/2020 1311    BUN 19 02/26/2017 1854    CREATININE 0.67 11/11/2020 1311    CREATININE 0.6 02/26/2017 1854        Component Value Date/Time    CALCIUM 9.2 02/26/2017 1854    ALKPHOS 61 11/11/2020 1311    AST 15 11/11/2020 1311    ALT 9 11/11/2020 1311    BILITOT 0.3 11/11/2020 1311            Lab Results   Component Value Date    GLUCOSE 89 11/11/2020    CALCIUM 9.2 02/26/2017     11/11/2020    K 4.7 11/11/2020    CO2 21 11/11/2020     11/11/2020    BUN 12 11/11/2020    CREATININE 0.67 11/11/2020       Lab Results   Component Value  Date    CHOL 186 11/11/2020    CHOL 173 05/30/2019     Lab Results   Component Value Date    HDL 57 11/11/2020    HDL 37 (L) 05/30/2019     Lab Results   Component Value Date    LDLCALC 110 (H) 11/11/2020    LDLCALC 113 (H) 05/30/2019     Lab Results   Component Value Date    TRIG 107 11/11/2020    TRIG 116 05/30/2019     No results found for: CHOLHDL    Lab Results   Component Value Date    TSH 2.220 11/11/2020       Lab Results   Component Value Date    HGBA1C 5.6 05/30/2019       Lab Results   Component Value Date    HEPCAB <0.1 11/11/2020         Immunization History   Administered Date(s) Administered   • Tdap 07/02/2019         Health Maintenance Topics with due status: Overdue       Topic Date Due    COVID-19 Vaccine Never done     Health Maintenance Topics with due status: Postponed       Topic Postponed Until    Influenza Vaccine 11/03/2021 (Originally 8/1/2021)     Health Maintenance Topics with due status: Not Due       Topic Last Completion Date    Cervical Cancer Screening 09/19/2018    DTaP, Tdap, and Td Vaccines 07/02/2019     Health Maintenance Topics with due status: Completed       Topic Last Completion Date    HIV Screening 11/11/2020    Hepatitis C Screening 11/11/2020     Health Maintenance Topics with due status: Aged Out       Topic Date Due    Meningococcal ACWY Aged Out    HIB Vaccines Aged Out    IPV Vaccines Aged Out    HPV Vaccines Aged Out    Pneumococcal Aged Out     Health Maintenance Topics with due status: Discontinued       Topic Date Due    Varicella Vaccines Discontinued          Assessment and Plan       Assessment/Plan     Problem List Items Addressed This Visit        Nervous    Chronic chest wall pain    Current Assessment & Plan     See History of Present Illness section for assessment and plan on all medical Diagnoses            Other    Encounter for general adult medical examination without abnormal findings - Primary    Current Assessment & Plan     -Eat diet with regular  meals including 2-4 fruit servings , 2-4 vegetable servings, whole grains and lean protien each day  -control portions of carbs and keep down fats and sugars in diet  -exercise for 150 mins per week of cardio or more and 1-2 days per week of something strengthening like yoga, pilates or lifting  -wear sunblock w SPF of 30 or higher  in sun to protect your skin, and reapply often  -avoid all tobacco products  -limit alcohol and caffiene  -aim to get 6-8 hrs of sleep each night  -see your eye doctor every 2-3 yrs  -see your dentist every 6-12 mos  FU for physical in one year               BMI 37.0-37.9, adult          Return in about 1 year (around 4/5/2022) for physical, prn.    No orders of the defined types were placed in this encounter.           Amy Morris, DO  4/5/2021

## 2021-04-05 NOTE — ASSESSMENT & PLAN NOTE
-Eat diet with regular meals including 2-4 fruit servings , 2-4 vegetable servings, whole grains and lean protien each day  -control portions of carbs and keep down fats and sugars in diet  -exercise for 150 mins per week of cardio or more and 1-2 days per week of something strengthening like yoga, pilates or lifting  -wear sunblock w SPF of 30 or higher  in sun to protect your skin, and reapply often  -avoid all tobacco products  -limit alcohol and caffiene  -aim to get 6-8 hrs of sleep each night  -see your eye doctor every 2-3 yrs  -see your dentist every 6-12 mos  FU for physical in one year

## 2021-04-22 RX ORDER — CYCLOBENZAPRINE HCL 10 MG
10 TABLET ORAL 3 TIMES DAILY PRN
Qty: 30 TABLET | Refills: 0 | Status: SHIPPED | OUTPATIENT
Start: 2021-04-22 | End: 2021-08-01 | Stop reason: SDUPTHER

## 2021-04-22 NOTE — TELEPHONE ENCOUNTER
Medicine Refill Request    Last Office Visit: 4/5/2021  Last Telemedicine Visit: 2/10/2021 Hardeep Lopez DO    Next Office Visit: Visit date not found  Next Telemedicine Visit: Visit date not found         Current Outpatient Medications:   •  cyclobenzaprine (FLEXERIL) 10 mg tablet, Take 1 tablet (10 mg total) by mouth 3 (three) times a day as needed for muscle spasms for up to 10 days., Disp: 30 tablet, Rfl: 0      BP Readings from Last 3 Encounters:   04/05/21 138/84   11/03/20 124/86   12/04/19 128/70       Recent Lab results:  Lab Results   Component Value Date    CHOL 186 11/11/2020   ,   Lab Results   Component Value Date    HDL 57 11/11/2020   ,   Lab Results   Component Value Date    LDLCALC 110 (H) 11/11/2020   ,   Lab Results   Component Value Date    TRIG 107 11/11/2020        Lab Results   Component Value Date    GLUCOSE 89 11/11/2020   ,   Lab Results   Component Value Date    HGBA1C 5.6 05/30/2019         Lab Results   Component Value Date    CREATININE 0.67 11/11/2020       Lab Results   Component Value Date    TSH 2.220 11/11/2020

## 2021-08-03 RX ORDER — CYCLOBENZAPRINE HCL 10 MG
10 TABLET ORAL 3 TIMES DAILY PRN
Qty: 30 TABLET | Refills: 0 | Status: SHIPPED | OUTPATIENT
Start: 2021-08-03 | End: 2023-05-01 | Stop reason: ALTCHOICE

## 2022-04-11 ENCOUNTER — APPOINTMENT (OUTPATIENT)
Dept: RADIOLOGY | Facility: CLINIC | Age: 47
End: 2022-04-11
Payer: COMMERCIAL

## 2022-04-11 ENCOUNTER — OFFICE VISIT (OUTPATIENT)
Dept: URGENT CARE | Facility: CLINIC | Age: 47
End: 2022-04-11
Payer: COMMERCIAL

## 2022-04-11 VITALS
BODY MASS INDEX: 39.05 KG/M2 | TEMPERATURE: 97.8 F | HEIGHT: 66 IN | HEART RATE: 93 BPM | OXYGEN SATURATION: 93 % | DIASTOLIC BLOOD PRESSURE: 106 MMHG | SYSTOLIC BLOOD PRESSURE: 168 MMHG | RESPIRATION RATE: 18 BRPM | WEIGHT: 243 LBS

## 2022-04-11 DIAGNOSIS — M79.645 FINGER PAIN, LEFT: Primary | ICD-10-CM

## 2022-04-11 DIAGNOSIS — M79.645 FINGER PAIN, LEFT: ICD-10-CM

## 2022-04-11 PROCEDURE — G0382 LEV 3 HOSP TYPE B ED VISIT: HCPCS

## 2022-04-11 PROCEDURE — 73140 X-RAY EXAM OF FINGER(S): CPT

## 2022-04-11 NOTE — PROGRESS NOTES
NAME: Kim Rios is a 55 y o  female  : 1975    MRN: [id number]      Assessment and Plan   Finger pain, left [M79 [id number]]  1  Finger pain, left  XR finger left fifth digit-pinkie      x-ray 5th digit, left:  Area suspicious for fracture on the palmar side of the middle phalanx at the proximal aspect  Patient has point tenderness here will call in a fracture  Nondisplaced  Patient tony-taped and offered ortho referral-she declines  Discussed treating with ice, elevation, tony taping and ibuprofen  If no improvement follow-up with PCP  Sooner if anything changes or worsens  She acknowledges      Patient Instructions     Patient Instructions   Ice to the area  Tony tape  Ibuprofen and Tylenol   If no improvement 1-2 weeks follow-up with PCP  Follow-up sooner if anything changes or worsens    Proceed to ER if symptoms worsen  Chief Complaint     Chief Complaint   Patient presents with    Hand Pain     left 5 th finger was jammed while moving boxes         History of Present Illness   Patient with no reported past medical history presents complaining of left 5th finger pain x1 day  Reports was moving boxes and accidentally jammed her left pinky against a box yesterday  Reports bruising and swelling and pain to the area since  Reports no pain at rest, pain only with movement  Unable to fully flex the finger to the swelling and pain  Denies any numbness or tingling to the tip of the finger  She is right-hand dominant  Took Advil and applied ice with some relief  Review of Systems   Review of Systems   Constitutional: Negative for chills and fever  Gastrointestinal: Negative for nausea and vomiting  Musculoskeletal:        Left pinky pain   Neurological: Negative for weakness and numbness           Current Medications       Current Outpatient Medications:     Levonorgestrel (MIRENA, 52 MG, IU), by Intrauterine route, Disp: , Rfl:     Current Allergies     Allergies as of 04/11/2022 - Reviewed 04/11/2022   Allergen Reaction Noted    Penicillins Hives 04/11/2022              No past medical history on file  No past surgical history on file  No family history on file  Medications have been verified  The following portions of the patient's history were reviewed and updated as appropriate: allergies, current medications, past family history, past medical history, past social history, past surgical history and problem list     Objective   BP (!) 168/106   Pulse 93   Temp 97 8 °F (36 6 °C)   Resp 18   Ht 5' 6" (1 676 m)   Wt 110 kg (243 lb)   SpO2 93%   BMI 39 22 kg/m²      Physical Exam     Physical Exam  Vitals and nursing note reviewed  Constitutional:       General: She is not in acute distress  Appearance: Normal appearance  She is not ill-appearing, toxic-appearing or diaphoretic  Cardiovascular:      Rate and Rhythm: Normal rate and regular rhythm  Pulmonary:      Effort: Pulmonary effort is normal  No respiratory distress  Musculoskeletal:      Comments: Left hand, 5th digit:  Edema and ecchymosis to the proximal aspect of the digit concentrated at the PIP joint  Tender to palpation over the PIP joint only on the palmar aspect, no tenderness at the lateral or dorsal aspects  No tenderness anywhere else in the hand or finger  Partial flexion due to pain and swelling  Full extension without pain  Full sensation light touch  Cap refill less than 2 seconds the distal tip  Skin:     Capillary Refill: Capillary refill takes less than 2 seconds  Neurological:      Mental Status: She is alert and oriented to person, place, and time

## 2022-04-11 NOTE — PATIENT INSTRUCTIONS
Ice to the area  Audie tape  Ibuprofen and Tylenol   If no improvement 1-2 weeks follow-up with PCP  Follow-up sooner if anything changes or worsens

## 2022-09-19 ENCOUNTER — TELEPHONE (OUTPATIENT)
Dept: OBGYN CLINIC | Facility: HOSPITAL | Age: 47
End: 2022-09-19

## 2022-09-19 NOTE — TELEPHONE ENCOUNTER
I received a Care Request from patient to schedule for:  Where Does it Hurt? Lower back   Are you considering joint replacement? No   Are you seeking a second opinion? No  If yes, who is your doctor? I lvm to cb to schedule

## 2022-09-22 ENCOUNTER — OFFICE VISIT (OUTPATIENT)
Dept: OBGYN CLINIC | Facility: CLINIC | Age: 47
End: 2022-09-22
Payer: COMMERCIAL

## 2022-09-22 ENCOUNTER — APPOINTMENT (OUTPATIENT)
Dept: RADIOLOGY | Facility: CLINIC | Age: 47
End: 2022-09-22
Payer: COMMERCIAL

## 2022-09-22 VITALS — HEIGHT: 66 IN | WEIGHT: 243 LBS | BODY MASS INDEX: 39.05 KG/M2 | TEMPERATURE: 97.5 F

## 2022-09-22 DIAGNOSIS — M54.42 ACUTE LEFT-SIDED LOW BACK PAIN WITH LEFT-SIDED SCIATICA: ICD-10-CM

## 2022-09-22 DIAGNOSIS — M54.42 ACUTE LEFT-SIDED LOW BACK PAIN WITH LEFT-SIDED SCIATICA: Primary | ICD-10-CM

## 2022-09-22 DIAGNOSIS — R29.898 WEAKNESS OF LEFT LOWER EXTREMITY: ICD-10-CM

## 2022-09-22 PROCEDURE — 99203 OFFICE O/P NEW LOW 30 MIN: CPT | Performed by: PHYSICIAN ASSISTANT

## 2022-09-22 PROCEDURE — 72100 X-RAY EXAM L-S SPINE 2/3 VWS: CPT

## 2022-09-22 RX ORDER — MELOXICAM 15 MG/1
15 TABLET ORAL DAILY
Qty: 30 TABLET | Refills: 0 | Status: SHIPPED | OUTPATIENT
Start: 2022-09-22

## 2022-09-22 NOTE — PROGRESS NOTES
Orthopaedic Surgery - Office Note  Harry John (15 y o  female)   : 1975   MRN: [id number]  Encounter Date: 2022    No chief complaint on file  Chief complaint low back and left leg pain      Assessment/Plan  Diagnoses and all orders for this visit:    Acute left-sided low back pain with left-sided sciatica  -     XR spine lumbar 2 or 3 views injury; Future  -     Ambulatory Referral to Physical Therapy; Future  -     Ambulatory Referral to Pain Management; Future  -     MRI lumbar spine wo contrast; Future  -     meloxicam (Mobic) 15 mg tablet; Take 1 tablet (15 mg total) by mouth daily    Weakness of left lower extremity(ehl 4-/5)    I reviewed with the patient she has some weakness on clinical examination with persistent discomfort despite treatment with oral steroids, muscle relaxers, and rest   I will start the patient in formal physical therapy as well as order an MRI of the lumbar spine that will be available for review when she follows up with the spine team   In addition I will add Mobic to be taken with food stopping calling if any stomach upset occurs  She may add Tylenol as needed for breakthrough pain  Return for Recheck with spine and pain to discuss MRI of the lumbar spine  History of Present Illness  This is a new patient with acute left-sided low back pain  She reports she has had chronic low back pain in the past and treated with her PCP  She reports that 2 weeks ago without trauma she awoke with the left-sided low back pain radiating all the way down the left lateral leg into the top of her foot causing it to be severely painful  She is treated with her PCP for this condition and had a course of oral steroids, muscle relaxers, and nerve medications  She has not tried physical therapy  She denies any red flag symptoms  She denies any groin pain  She has not had an MRI of her lumbar spine  She reports her foot feels weak    And she noted such severe to pain while getting her x-ray she nearly passed out but did not  Review of Systems  Pertinent items are noted in HPI  All other systems were reviewed and are negative  Physical Exam  Temp 97 5 °F (36 4 °C)   Ht 5' 6" (1 676 m)   Wt 110 kg (243 lb)   BMI 39 22 kg/m²   Cons: Appears well  No apparent distress  Psych: Alert  Oriented x3  Mood and affect normal   Eyes: PERRLA, EOMI  Resp: Normal effort  No audible wheezing or stridor  CV: Palpable pulse  No discernable arrhythmia  Lymph:  No palpable cervical, axillary, or inguinal lymphadenopathy  Skin: Warm  No palpable masses  No visible lesions  Neuro: Normal muscle tone  Normal and symmetric DTR's  Patient is sitting uncomfortably in the exam room chair leaning hard to the right to try and find a comfortable position  She has limited truncal range of motion and a positive straight leg raise  She has sensation intact to light touch in the left lower extremity  EHL strength is 4-out of 5  Plantar flexion strength is 5/5  Deep tendon reflexes on the left are +1 compared to +2 on the right  She has no calf tenderness  Her gait is antalgic favoring the left side  Patient had a near syncopal episode while getting her lumbar x-rays completed in the office today due to such severe pain in the left low back shooting down the left leg  She did not lose consciousness or sustain an injury  Studies Reviewed  X-rays performed in the office today of the lumbar spine show no acute fractures or dislocations  Degenerative changes are noted at L5-S1 with associated loss of joint space  Mild Loss of normal curvature is seen  This was read from an orthopedic standpoint will await official radiologist interpretation    Procedures  No procedures today  Medical, Surgical, Family, and Social History  The patient's medical history, family history, and social history, were reviewed and updated as appropriate      No past medical history on file     No past surgical history on file  No family history on file      Social History     Occupational History    Not on file   Tobacco Use    Smoking status: Former Smoker     Types: Cigarettes    Smokeless tobacco: Never Used   Substance and Sexual Activity    Alcohol use: Not on file    Drug use: Not on file    Sexual activity: Not on file       Allergies   Allergen Reactions    Penicillins Hives         Current Outpatient Medications:     meloxicam (Mobic) 15 mg tablet, Take 1 tablet (15 mg total) by mouth daily, Disp: 30 tablet, Rfl: 0    Levonorgestrel (MIRENA, 52 MG, IU), by Intrauterine route, Disp: , Rfl:       Marcell Francisco PA-C

## 2022-09-22 NOTE — PATIENT INSTRUCTIONS
Lumbar Disc Herniation   WHAT YOU NEED TO KNOW:   What is lumbar disc herniation? Lumbar disc herniation occurs when a disc in your lumbar spine (lower back) bulges out  Lumbar discs are spongy cushions between the vertebrae (bones) in your spine  The herniated disc may press on your nerves or spinal cord  What increases my risk for lumbar disc herniation? Aging increases your risk for a herniated disc because your discs weaken and shrink as you get older  This causes the disc to lose its cushion and bulge out between your vertebrae  The following also increase your risk for a herniated disc:  Back strain caused by heavy lifting     Repetitive activities that strain your back, such as jobs that require pulling, bending, or twisting    Frequent driving     Being overweight    Not getting regular physical activity    What are the signs and symptoms of lumbar disc herniation? Mild lumbar disc herniation may not cause any signs or symptoms  You may have any of the following if the herniated disc presses against your nerves or spinal cord:  Pain in your lower back, buttocks, groin, or legs    Burning, stabbing, or tingling pain that shoots down one or both of your legs    Numbness or weakness in one leg    Trouble walking or moving your feet or toes    How is lumbar disc herniation diagnosed? Your healthcare provider will ask you about your symptoms and any health problems you may have  He or she may examine your spine for any abnormal areas  Tell him or her if any area on your spine is painful when touched  Your provider may also check the reflexes in your ankles and knees  You may also need any of the following tests:  A straight leg raise test  is done to check if your disc is pressing against your nerves or spinal cord  Your healthcare provider will ask you to lie flat on your back, with your legs straight  He or she will then raise each leg as far as possible   Ask your provider to stop if this test causes you pain  An MRI or a CT  may show the bulging disc  You may be given contrast liquid to help the disc show up better in the pictures  Tell the healthcare provider if you have ever had an allergic reaction to contrast liquid  Do not enter the MRI room with anything metal  Metal can cause serious injury  Tell the healthcare provider if you have any metal in or on your body  A myelography  is an x-ray or CT scan of your spinal cord  Dye will be given as an injection into the area around your spinal cord before the pictures are taken  How is lumbar disc herniation treated? Your healthcare provider may have you rest in bed for a few days  It is best to rest on your side with your knees bent  Put a cushion between your knees to help decrease the pressure on your spine and nerves  You may also need any of following:  NSAIDs , such as ibuprofen, help decrease swelling, pain, and fever  NSAIDs can cause stomach bleeding or kidney problems in certain people  If you take blood thinner medicine, always ask your healthcare provider if NSAIDs are safe for you  Always read the medicine label and follow directions  Prescription pain medicine  may be given  Ask how to take this medicine safely  Muscle relaxers  decrease pain and muscle spasms  A steroid injection  may be given to reduce inflammation  Steroid medicine is injected into the epidural space  The epidural space is between your spinal cord and vertebrae  You may be given pain medicine along with the steroids  Physical therapy  may be recommended by your healthcare provider  A physical therapist teaches you exercises to help improve movement and strength, and to decrease pain  A physical therapist can teach you safe ways to bend, lift, sit, and stand to help relieve back pain  Surgery  may be needed to fix your herniated disc if other treatments have failed  Surgery may be done to remove your herniated disc and make your spine stronger   Surgery may also be done to decrease pressure on your nerves and spinal cord  How can I manage pain from a lumbar disc herniation? Apply heat  on your lower back for 20 to 30 minutes every 2 hours for as many days as directed  Heat helps decrease pain and muscle spasms  Do low-stress exercise and activity  Exercises that do not stress your back muscles may help decrease your pain  Examples of low-stress exercises are walking, swimming, and biking  Avoid heavy lifting while your back is healing  Try not to sit for long periods of time  Talk to your healthcare provider before you start any new exercise program     When should I seek immediate care? You cannot control when you urinate or have a bowel movement  You are unable to move one or both of your legs  You lose feeling in your groin or buttocks  When should I contact my healthcare provider? You have numbness in one or both of your legs  You have low back pain while resting  You have trouble moving one or both of your legs  You begin leaking urine or bowel movement  Your pain gets worse, even after you take medicine  You have questions or concerns about your condition or care  CARE AGREEMENT:   You have the right to help plan your care  Learn about your health condition and how it may be treated  Discuss treatment options with your healthcare providers to decide what care you want to receive  You always have the right to refuse treatment  The above information is an  only  It is not intended as medical advice for individual conditions or treatments  Talk to your doctor, nurse or pharmacist before following any medical regimen to see if it is safe and effective for you  © Copyright [employer] 2022 Information is for End User's use only and may not be sold, redistributed or otherwise used for commercial purposes   All illustrations and images included in CareNotes® are the copyrighted property of A D A M , Inc  or [address] [facility]

## 2022-09-27 ENCOUNTER — EVALUATION (OUTPATIENT)
Dept: PHYSICAL THERAPY | Facility: CLINIC | Age: 47
End: 2022-09-27
Payer: COMMERCIAL

## 2022-09-27 DIAGNOSIS — M54.42 ACUTE LEFT-SIDED LOW BACK PAIN WITH LEFT-SIDED SCIATICA: ICD-10-CM

## 2022-09-27 DIAGNOSIS — M54.16 LUMBAR RADICULOPATHY: Primary | ICD-10-CM

## 2022-09-27 PROCEDURE — 97161 PT EVAL LOW COMPLEX 20 MIN: CPT | Performed by: PHYSICAL THERAPIST

## 2022-09-27 PROCEDURE — 97110 THERAPEUTIC EXERCISES: CPT | Performed by: PHYSICAL THERAPIST

## 2022-09-27 NOTE — PROGRESS NOTES
PT Evaluation     Today's date: 2022  Patient name: Beth Lazaro  : 1975  MRN: [id number]  Referring provider: ARMANDO Tipton*  Dx:   Encounter Diagnosis     ICD-10-CM    1  Lumbar radiculopathy  M54 16                   Assessment  Assessment details: Beth Lazaro is a 52 y o  female presenting to outpatient physical therapy at [place] [address] with complaints of Lumbar and LE pain   They present with decreased range of motion, decreased strength, limited flexibility, poor postural awareness, poor body mechanics, poor balance, decreased tolerance to activity and decreased functional mobility due to Lumbar radiculopathy  (primary encounter diagnosis)  Olamide Ortez would benefit from skilled physical therapy to address noted impairments in order to allow for full functional return to work and ADL-related activities  Thank you for the referral!  Impairments: abnormal muscle firing, abnormal or restricted ROM, activity intolerance, impaired balance, impaired physical strength, lacks appropriate home exercise program, pain with function and poor body mechanics  Barriers to therapy: n/a  Understanding of Dx/Px/POC: excellent  Goals  ST   Independent with HEP in 2 weeks  2  Decrease pain by 50% in 3 wks  3   Increase lumbar ext AROM to [facility], no pain in 3 weeks     LT  Achieve FOTO score of 67/100 in 6 weeks   2   Able to lumbar arom WFL all planes in 6 weeks  3  Strength = 5/5 bilat hip all planes in 6 weeks  4  Able to perform box lift floor to waist 10# in 6 weeks      Plan  Plan details: RE in 6 weeks    Patient would benefit from: skilled physical therapy  Planned modality interventions: cryotherapy, electrical stimulation/Russian stimulation and thermotherapy: hydrocollator packs  Planned therapy interventions: abdominal trunk stabilization, manual therapy, neuromuscular re-education, therapeutic activities, therapeutic exercise, body mechanics training and home exercise program  Frequency: 2x week  Duration in visits: 12  Duration in weeks: 6  Plan of Care beginning date: 9/27/2022  Plan of Care expiration date: 11/11/2022  Treatment plan discussed with: patient        Subjective Evaluation    History of Present Illness  Mechanism of injury: Pt c/o lumbar pain that began Sept 11  She woke up in the AM and felt a pinching in the midline lumbar region and radiates down the LLE to the toes  No injury noted  She has tried steroids, muscle relaxers, nerve blocker (made her sick), activity/exercise all without relief  She has a h/o similar back pain, usually resolved within a few days with muscle relaxers  The left leg radiation is a new symptom with this episode of back pain  Image results pending, though negative for acute fx  MRI is scheduled  Pain originates in low midline lumbar region and radiates posterior LLE to the toes  Intermittent, pinching, shooting  Rated 5/10 when still, 8/10 when moving  Agg with bending at the waist, twisting, sitting  Eased with lying flat on her back  Positive for tingling in the toes  Denies saddle anesthesia, bowel/bladder changes, recent infection, LE weakness, gait difficulties  She works as a  for a grocery store  She has not been working for the past 2 weeks since onset secondary to her inability to do job-related tasks such as bending and lifting  She is doing all ADLs and driving independently, but has difficulty doing housework such as lifting, getting on/off of the floor, mopping/sweeping    Patient Goals  Patient goals for therapy: decreased edema, decreased pain, improved balance, increased motion, return to work, return to [employer] activities, independence with ADLs/IADLs and increased strength          Objective     Postural Observations    Additional Postural Observation Details  Hips symmetrical standing and supine; NL spinal curves maintained    Tenderness     Additional Tenderness Details  TTP L4 L5 spinous process    (+) direction preference: ext  (-) L slump  (-) L SLR    L SLR to 10 deg secondary to pain    Neurological Testing     Sensation     Lumbar   Left   Intact: light touch    Right   Intact: light touch    Reflexes   Left   Patellar (L4): normal (2+)  Achilles (S1): normal (2+)    Right   Patellar (L4): normal (2+)  Achilles (S1): normal (2+)    Active Range of Motion     Lumbar   Flexion:  with pain Restriction level: moderate  Extension:  with pain Restriction level: minimal  Left lateral flexion:  with pain Restriction level: moderate  Right lateral flexion:  with pain Restriction level: moderate  Left rotation:  with pain Restriction level: moderate  Right rotation:  with pain Restriction level: moderate    Joint Play   Joints within functional limits: T8, T9, T10, T11, T12, L1, L2, L3, L4, L5 and S1     Pain: L3, L4, L5 and S1   Mechanical Assessment    Cervical      Thoracic      Lumbar    Standing flexion: sustained positions   Pain intensity: worse  Standing extension: repeated movements  Pain location: centralized    Strength/Myotome Testing     Lumbar   Left   Normal strength    Right   Normal strength    Functional Assessment        Comments  Gait: decr gait speed             Precautions: Lumbar pain with LLE radiation --- extension bias  Other factors: h/o lumbar pain  Pt goals: able to bend and lift from the floor for return to work ASAP      HEP:  Access Code: [email or web address]  URL: [email or web address]  Date: 09/27/2022  Prepared by: Chip Cai    Exercises  · Lying Prone - 5-10 mins hold  · Prone Press Up On Elbows - 10 reps            Manuals 9/27                                                                Neuro Re-Ed                                                                                                        Ther Ex             TM             Prone lying 5'            ROSS reps x5            Lumbar ext arom             Prone glute set             PHE Ther Activity                                       Gait Training                                       Modalities

## 2022-09-29 ENCOUNTER — TELEPHONE (OUTPATIENT)
Dept: OBGYN CLINIC | Facility: CLINIC | Age: 47
End: 2022-09-29

## 2022-09-29 ENCOUNTER — OFFICE VISIT (OUTPATIENT)
Dept: PHYSICAL THERAPY | Facility: CLINIC | Age: 47
End: 2022-09-29
Payer: COMMERCIAL

## 2022-09-29 DIAGNOSIS — M54.16 LUMBAR RADICULOPATHY: Primary | ICD-10-CM

## 2022-09-29 DIAGNOSIS — M54.42 ACUTE LEFT-SIDED LOW BACK PAIN WITH LEFT-SIDED SCIATICA: ICD-10-CM

## 2022-09-29 PROCEDURE — 97140 MANUAL THERAPY 1/> REGIONS: CPT | Performed by: PHYSICAL THERAPIST

## 2022-09-29 PROCEDURE — 97110 THERAPEUTIC EXERCISES: CPT | Performed by: PHYSICAL THERAPIST

## 2022-09-29 NOTE — PROGRESS NOTES
Daily Note     Today's date: 2022  Patient name: Antelmo Beard  : 1975  MRN: [id number]  Referring provider: Virlinda Landau, PA*  Dx:   Encounter Diagnosis     ICD-10-CM    1  Lumbar radiculopathy  M54 16    2  Acute left-sided low back pain with left-sided sciatica  M54 42        Start Time: 1030          Subjective: Did feel some relief after lv, but overall no change in symptoms today  She has been compliant with HEP every 2 hrs      Objective: See treatment diary below      Assessment: Variable response to tx today  She continues to have radiating pain down the LLE with flexion and L side bending, though centralization with extension and R SB  Added R SB to HEP every 2 hours  Favorable to UBE for general cardio  Tolerated treatment well  Patient demonstrated fatigue post treatment and would benefit from continued PT      Plan: Continue per plan of care  Precautions: Lumbar pain with LLE radiation --- extension bias  Other factors: h/o lumbar pain  Pt goals: able to bend and lift from the floor for return to work ASAP      HEP:  Access Code: [email or web address]  URL: [email or web address]  Date: 2022  Prepared by: Marcelina Mann    Exercises  · Lying Prone - 5-10 mins hold  · Prone Press Up On Elbows - 10 reps            Manuals            Lumbar gapping  p! Lumbar STM  R paraspin JAYDEN           Lumbar unilat PA  L caused P!                         Neuro Re-Ed                                                                                                        Ther Ex             UBE  1' fwd 1' bwd x6'           Prone lying 5'            ROSS reps x5 x20           Lumbar ext arom             Prone glute set  20" x3           PHE  Alt 2x10 ea                                     Ther Activity                                       Gait Training                                       Modalities             MHP  prone + HOB elev 10'

## 2022-10-04 ENCOUNTER — OFFICE VISIT (OUTPATIENT)
Dept: PHYSICAL THERAPY | Facility: CLINIC | Age: 47
End: 2022-10-04
Payer: COMMERCIAL

## 2022-10-04 DIAGNOSIS — M54.16 LUMBAR RADICULOPATHY: Primary | ICD-10-CM

## 2022-10-04 DIAGNOSIS — M54.42 ACUTE LEFT-SIDED LOW BACK PAIN WITH LEFT-SIDED SCIATICA: ICD-10-CM

## 2022-10-04 PROCEDURE — 97110 THERAPEUTIC EXERCISES: CPT | Performed by: PHYSICAL THERAPIST

## 2022-10-04 NOTE — PROGRESS NOTES
Daily Note     Today's date: 10/4/2022  Patient name: Keara Fulton  : 1975  MRN: [id number]  Referring provider: ARMANDO Buckley*  Dx:   Encounter Diagnosis     ICD-10-CM    1  Lumbar radiculopathy  M54 16    2  Acute left-sided low back pain with left-sided sciatica  M54 42        Start Time: 1359          Subjective: Woke up the morning after lv in 8/10 pain  Held on the exercises that day and felt relief  Has been compliant with the HEP every other day and added the R sidebending  Avg pain has decreased from a 6/10 to a 5  Objective: See treatment diary below      Assessment: Held on manual therapy today as it was the main agg factor noted during lv  Continues to benefit from extension and R sidebending  Ended with upper body cardio and strengthening circuit for general exercise and pain mgmt  No issues noted  Updated HEP  Tolerated treatment well  Patient demonstrated fatigue post treatment and would benefit from continued PT      Plan: Continue per plan of care  Precautions: Lumbar pain with LLE radiation --- extension bias  Other factors: h/o lumbar pain  Pt goals: able to bend and lift from the floor for return to work ASAP      HEP:  Access Code: [email or web address]  URL: [email or web address]  Date: 10/04/2022  Prepared by: Janusz Hallmark    Exercises  · Lying Prone - 5-10 mins hold  · Standing Lumbar Extension - 2 sets - 20 reps  · Standing Sidebends - 2 sets - 20 reps          Manuals  10/          Lumbar gapping  p! held          Lumbar STM  R paraspin JAYDEN held          Lumbar unilat PA  L caused P! held                       Neuro Re-Ed                                                                                                        Ther Ex             UBE  1' fwd 1' bwd x6'           Prone lying 5'            ROSS reps x5 x20 x20          Lumbar SB arom   R only 3x20          Lumbar ext arom   3x20          Prone glute set  20" x3 20"x3          PHE  Alt 2x10 ea held          circuit   3 rds:  1' UBE  Row x10  Chest press x10                                                              Ther Activity                                       Gait Training                                       Modalities             MHP  prone + HOB elev 10' prone + HOB elev 10'

## 2022-10-06 ENCOUNTER — OFFICE VISIT (OUTPATIENT)
Dept: PHYSICAL THERAPY | Facility: CLINIC | Age: 47
End: 2022-10-06
Payer: COMMERCIAL

## 2022-10-06 DIAGNOSIS — M54.42 ACUTE LEFT-SIDED LOW BACK PAIN WITH LEFT-SIDED SCIATICA: ICD-10-CM

## 2022-10-06 DIAGNOSIS — M54.16 LUMBAR RADICULOPATHY: Primary | ICD-10-CM

## 2022-10-06 PROCEDURE — 97110 THERAPEUTIC EXERCISES: CPT | Performed by: PHYSICAL THERAPIST

## 2022-10-06 NOTE — PROGRESS NOTES
Daily Note     Today's date: 10/6/2022  Patient name: Judy Curiel  : 1975  MRN: [id number]  Referring provider: ARMANDO Blanton*  Dx:   Encounter Diagnosis     ICD-10-CM    1  Lumbar radiculopathy  M54 16    2  Acute left-sided low back pain with left-sided sciatica  M54 42        Start Time: 0915          Subjective: Feeling a little better  Felt fine after circuit training lv  Objective: See treatment diary below      Assessment: Continues to benefit from directional specific movements into extension and R LF  Again ended with circuit training which she tolerated fine  Tolerated treatment well  Patient demonstrated fatigue post treatment and would benefit from continued PT      Plan: Continue per plan of care  Precautions: Lumbar pain with LLE radiation --- extension bias  Other factors: h/o lumbar pain  Pt goals: able to bend and lift from the floor for return to work ASAP      HEP:  Access Code: [email or web address]  URL: [email or web address]  Date: 10/04/2022  Prepared by: Oniel Dillon    Exercises  · Lying Prone - 5-10 mins hold  · Standing Lumbar Extension - 2 sets - 20 reps  · Standing Sidebends - 2 sets - 20 reps          Manuals 9/27 9/29 10/4 10/6         Lumbar gapping  p! held          Lumbar STM  R paraspin JAYDEN held          Lumbar unilat PA  L caused P! held                       Neuro Re-Ed                                                                                                        Ther Ex             UBE  1' fwd 1' bwd x6'           Prone lying 5'            ROSS reps x5 x20 x20 x20         Lumbar SB arom   R only 3x20 R only 3x20         Lumbar ext arom   3x20 3x20         Prone glute set  20" x3 20"x3          PHE  Alt 2x10 ea held          Standing hip ext    x10 ea         circuit   3 rds:  1' UBE  Row x10  Chest press x10 4 rds:  1' UBE  Row x15  Chest press x15                                                             Ther Activity Gait Training                                       Modalities             MHP  prone + HOB elev 10' prone + HOB elev 10' prone + HOB elev 10'

## 2022-10-11 ENCOUNTER — OFFICE VISIT (OUTPATIENT)
Dept: PHYSICAL THERAPY | Facility: CLINIC | Age: 47
End: 2022-10-11
Payer: COMMERCIAL

## 2022-10-11 DIAGNOSIS — M54.16 LUMBAR RADICULOPATHY: Primary | ICD-10-CM

## 2022-10-11 DIAGNOSIS — M54.42 ACUTE LEFT-SIDED LOW BACK PAIN WITH LEFT-SIDED SCIATICA: ICD-10-CM

## 2022-10-11 PROCEDURE — 97110 THERAPEUTIC EXERCISES: CPT | Performed by: PHYSICAL THERAPIST

## 2022-10-11 NOTE — PROGRESS NOTES
Daily Note     Today's date: 10/11/2022  Patient name: Rachel See  : 1975  MRN: [id number]  Referring provider: ARMANDO Mark*  Dx:   Encounter Diagnosis     ICD-10-CM    1  Lumbar radiculopathy  M54 16    2  Acute left-sided low back pain with left-sided sciatica  M54 42        Start Time: 1448          Subjective: Pain is at a 6/10 today  No longer radiating down the LLE, but now the RLE  Still benefiting from extension in prone  Objective: See treatment diary below      Assessment: Despite increased pain the past few days, a FOTO score improvement from 40 at IE to 48 was recorded today  Pt does have a consult with pain mgmt tomorrow and I advised her that an epidural steroid injection would likely be best, if offered, at this time in order to alleviate intensity and irritability of her symptoms  She verbalized agreement  Symptoms were unchanged with loaded and unloaded repeated movements today  Trial of all lumbar planes of motion today  Extension continues to be the least painful, though symptoms return as soon as she returns to neutral  Did not perform any other exercise today in order to avoid exacerbating symptoms further  Tolerated treatment well  Patient demonstrated fatigue post treatment and would benefit from continued PT      Plan: Continue per plan of care  F/u with consult with pain mgmt  Precautions: Lumbar pain with LLE radiation --- extension bias  Other factors: h/o lumbar pain  Pt goals: able to bend and lift from the floor for return to work ASAP      HEP:  Access Code: [email or web address]  URL: [email or web address]  Date: 10/04/2022  Prepared by: Neda Hutchinson    Exercises  · Lying Prone - 5-10 mins hold  · Standing Lumbar Extension - 2 sets - 20 reps  · Standing Sidebends - 2 sets - 20 reps          Manuals 9/27 9/29 10/4 10/6 10/11        Lumbar gapping  p! held          Lumbar STM  R paraspin JAYDEN held          Lumbar unilat PA  L caused P! held Neuro Re-Ed                                                                                                        Ther Ex             UBE  1' fwd 1' bwd x6'           Prone lying 5'    1'        ROSS reps x5 x20 x20 x20 3x20        Lumbar SB arom   R only 3x20 R only 3x20         Lumbar ext arom   3x20 3x20         Prone glute set  20" x3 20"x3          PHE  Alt 2x10 ea held          Standing hip ext    x10 ea         circuit   3 rds:  1' UBE  Row x10  Chest press x10 4 rds:  1' UBE  Row x15  Chest press x15                                                             Ther Activity                                       Gait Training                                       Modalities             MHP  prone + HOB elev 10' prone + HOB elev 10' prone + HOB elev 10' prone + HOB elev 10'

## 2022-10-12 ENCOUNTER — CONSULT (OUTPATIENT)
Dept: PAIN MEDICINE | Facility: CLINIC | Age: 47
End: 2022-10-12
Payer: COMMERCIAL

## 2022-10-12 VITALS
HEIGHT: 66 IN | HEART RATE: 92 BPM | TEMPERATURE: 98.1 F | WEIGHT: 241 LBS | BODY MASS INDEX: 38.73 KG/M2 | DIASTOLIC BLOOD PRESSURE: 90 MMHG | SYSTOLIC BLOOD PRESSURE: 130 MMHG

## 2022-10-12 DIAGNOSIS — M54.16 LUMBAR RADICULOPATHY: Primary | ICD-10-CM

## 2022-10-12 DIAGNOSIS — M47.816 LUMBAR SPONDYLOSIS: ICD-10-CM

## 2022-10-12 PROBLEM — J30.2 SEASONAL ALLERGIC RHINITIS: Status: ACTIVE | Noted: 2019-04-03

## 2022-10-12 PROBLEM — K58.9 IBS (IRRITABLE BOWEL SYNDROME): Status: ACTIVE | Noted: 2018-05-24

## 2022-10-12 PROBLEM — K21.9 GERD WITHOUT ESOPHAGITIS: Status: ACTIVE | Noted: 2019-04-03

## 2022-10-12 PROCEDURE — 99204 OFFICE O/P NEW MOD 45 MIN: CPT | Performed by: ANESTHESIOLOGY

## 2022-10-12 RX ORDER — CYCLOBENZAPRINE HCL 10 MG
10 TABLET ORAL 3 TIMES DAILY PRN
COMMUNITY
Start: 2022-08-03

## 2022-10-12 RX ORDER — PREDNISONE 10 MG/1
TABLET ORAL
Qty: 21 TABLET | Refills: 0 | Status: SHIPPED | OUTPATIENT
Start: 2022-10-12

## 2022-10-12 NOTE — PROGRESS NOTES
Assessment  1  Lumbar radiculopathy    2  Lumbar spondylosis        Plan      At this point the patient's pain persists despite time, relative rest, activity modification, and nonsteroidal anti-inflammatories  She is undergone physical therapy with exacerbation of symptoms and has neurological deficit  Her pain is significantly interfering with her daily living activities  It is appropriate to order an MRI of the lumbar spine to rule out any significant etiology of her symptoms  I will start her on a titrating dose of oral prednisone to address any inflammatory component of the patient's pain  She understands she should not take nonsteroidal anti-inflammatories until she is finished with this steroid  She understands there is a chance of decreasing immunity secondary to steroids  If she has any problems or questions she will give us a call  Once we obtain MRI results, I will follow-up with the patient, review the results and current symptoms, and discuss the next steps of the treatment plan  My impressions and treatment recommendations were discussed in detail with the patient who verbalized understanding and had no further questions  Discharge instructions were provided  I personally saw and examined the patient and I agree with the above discussed plan of care  This note is created using dictation transcription  It may contain typographical errors, grammatical errors, improperly dictated words, background noise and other errors  Orders Placed This Encounter   Procedures   • MRI lumbar spine without contrast     Standing Status:   Future     Standing Expiration Date:   10/12/2026     Scheduling Instructions: There is no preparation for this test  Please leave your jewelry and valuables at home, wedding rings are the exception  All patients will be required to change into a hospital gown and pants  Street clothes are not permitted in the MRI    Magnetic nail polish must be removed prior to arrival for your test  Please bring your insurance cards, a form of photo ID and a list of your medications with you  Arrive 15 minutes prior to your appointment time in order to register  Please bring any prior CT or MRI studies of this area that were not performed at a [facility]  To schedule this appointment, please contact Central Scheduling at [phone]  Prior to your appointment, please make sure you complete the MRI Screening Form when you e-Check in for your appointment  This will be available starting 7 days before your appointment in [address]  You may receive an e-mail with an activation code if you do not have a [employer] account  If you do not have access to a device, we will complete your screening at your appointment  Order Specific Question:   What is the patient's sedation requirement? Answer:   No Sedation     Order Specific Question:   Is the patient pregnant? Answer:   Unknown     Order Specific Question:   Release to patient through [employer]     Answer:   Immediate     Order Specific Question:   Is order priority selected as STAT? Answer:   No     Order Specific Question:   Reason for Exam (FREE TEXT)     Answer:   right lumbar radic     New Medications Ordered This Visit   Medications   • cyclobenzaprine (FLEXERIL) 10 mg tablet     Sig: Take 10 mg by mouth 3 (three) times a day as needed   • predniSONE 10 mg tablet     Sig: Take according to titration schedule     Dispense:  21 tablet     Refill:  0     Referred By: Nasima Love PA-C  History of Present Illness    Chris Carrero is a 52 y o  female with six week history of low back initially down the left leg now pain radiating down the right leg with right leg weakness  She is unaware of any clear precipitating event denies any trauma or injury  She is undergone five course of physical therapy which aggravated her symptoms    She is tried oral steroids nonsteroidal anti-inflammatories muscle relaxants and nerve membrane stabilizing medications but her pain persists which is severe  She rates as 6/10 on the visual analog scale significant interfering with her daily living activities  She is no longer working at this time  Her pain is nearly constant describes shooting in her back numbing down her leg  She reports that lying down and relaxation decreases symptoms will standing, bending, sitting and walking aggravate her pain  Denies bowel or bladder dysfunction  I have personally reviewed and/or updated the patient's past medical history, past surgical history, family history, social history, current medications, allergies, and vital signs today  Review of Systems   Constitutional: Negative for fever and unexpected weight change  HENT: Negative for trouble swallowing  Eyes: Negative for visual disturbance  Respiratory: Negative for shortness of breath and wheezing  Cardiovascular: Negative for chest pain and palpitations  Gastrointestinal: Negative for constipation, diarrhea, nausea and vomiting  Endocrine: Negative for cold intolerance, heat intolerance and polydipsia  Genitourinary: Negative for difficulty urinating and frequency  Musculoskeletal: Positive for arthralgias and myalgias  Negative for gait problem and joint swelling  Skin: Negative for rash  Neurological: Negative for dizziness, seizures, syncope, weakness and headaches  Hematological: Does not bruise/bleed easily  Psychiatric/Behavioral: Negative for dysphoric mood  All other systems reviewed and are negative  Patient Active Problem List   Diagnosis   • GERD without esophagitis   • IBS (irritable bowel syndrome)   • Seasonal allergic rhinitis       Past Medical History:   Diagnosis Date   • Inflammatory bowel disease        Past Surgical History:   Procedure Laterality Date   • CHOLECYSTECTOMY         History reviewed  No pertinent family history      Social History     Occupational History   • Not on file   Tobacco Use   • Smoking status: Former Smoker     Types: Cigarettes   • Smokeless tobacco: Never Used   Substance and Sexual Activity   • Alcohol use: Not on file   • Drug use: Not on file   • Sexual activity: Not on file       Current Outpatient Medications on File Prior to Visit   Medication Sig   • cyclobenzaprine (FLEXERIL) 10 mg tablet Take 10 mg by mouth 3 (three) times a day as needed   • Levonorgestrel (MIRENA, 52 MG, IU) by Intrauterine route   • meloxicam (Mobic) 15 mg tablet Take 1 tablet (15 mg total) by mouth daily     No current facility-administered medications on file prior to visit  Allergies   Allergen Reactions   • Zinc Hives and Shortness Of Breath   • Gabapentin Syncope   • Sulfa Antibiotics Hives   • Sulfasalazine Hives   • Penicillins Hives       Physical Exam    /90 (BP Location: Left arm, Patient Position: Sitting, Cuff Size: Standard)   Pulse 92   Temp 98 1 °F (36 7 °C)   Ht 5' 6" (1 676 m)   Wt 109 kg (241 lb)   BMI 38 90 kg/m²     Constitutional: normal, well developed, well nourished, alert, in no distress and non-toxic and no overt pain behavior   and obese  Eyes: anicteric  HEENT: grossly intact  Neck: supple, symmetric, trachea midline and no masses   Pulmonary:even and unlabored  Cardiovascular:No edema or pitting edema present  Skin:Normal without rashes or lesions and well hydrated  Psychiatric:Mood and affect appropriate  Neurologic:Cranial Nerves II-XII grossly intact  Musculoskeletal:normal, difficulty going from sitting to standing sitting position, no obvious skin lesions or erythema lumbar sacral spine, no tenderness to palpation lumbar sacral spine spinous process sacroiliac joint or greater trochanter bilateral, deep tendon reflexes are absent right patellar 2+ left patella diminished but symmetrical bilateral Achilles, decreased sensation right L5 distribution to pinwheel, positive straight leg raising on the right negative the left 3-5 strength right extensor hallucis longus no other motor deficits appreciated  Imaging  LUMBAR SPINE @  [id number]     INDICATION:   M54 42: Lumbago with sciatica, left side      COMPARISON:  None     VIEWS:  XR SPINE LUMBAR 2 OR 3 VIEWS INJURY  Images: 3     FINDINGS:     There are 5 non rib bearing lumbar vertebral bodies       There is no evidence of acute fracture or destructive osseous lesion      Minimal grade I spondylolithesis L4 on L5 probably secondary to degenerative facet arthropathy       Degenerative disc disease with disc space narrowing L5-S1  Tiny marginal endplate osteophytes [id number], L1-L2 and L3-4      The pedicles appear intact  Left sided osteitis condensans ilii      Soft tissues are unremarkable  Cholecystectomy clips         IMPRESSION:     No acute osseous abnormality        Degenerative changes as described  I have personally reviewed pertinent films in PACS and my interpretation is Lumbar spondylosis

## 2022-10-13 ENCOUNTER — APPOINTMENT (OUTPATIENT)
Dept: PHYSICAL THERAPY | Facility: CLINIC | Age: 47
End: 2022-10-13

## 2022-10-18 ENCOUNTER — TELEPHONE (OUTPATIENT)
Dept: PAIN MEDICINE | Facility: MEDICAL CENTER | Age: 47
End: 2022-10-18

## 2022-10-18 ENCOUNTER — TELEPHONE (OUTPATIENT)
Dept: OBGYN CLINIC | Facility: HOSPITAL | Age: 47
End: 2022-10-18

## 2022-10-18 ENCOUNTER — OFFICE VISIT (OUTPATIENT)
Dept: PHYSICAL THERAPY | Facility: CLINIC | Age: 47
End: 2022-10-18
Payer: COMMERCIAL

## 2022-10-18 DIAGNOSIS — M54.42 ACUTE LEFT-SIDED LOW BACK PAIN WITH LEFT-SIDED SCIATICA: ICD-10-CM

## 2022-10-18 DIAGNOSIS — M54.16 LUMBAR RADICULOPATHY: Primary | ICD-10-CM

## 2022-10-18 PROCEDURE — 97110 THERAPEUTIC EXERCISES: CPT | Performed by: PHYSICAL THERAPIST

## 2022-10-18 PROCEDURE — 97112 NEUROMUSCULAR REEDUCATION: CPT | Performed by: PHYSICAL THERAPIST

## 2022-10-18 NOTE — PROGRESS NOTES
Daily Note     Today's date: 10/18/2022  Patient name: Beth Lazaro  : 1975  MRN: [id number]  Referring provider: ARMANDO Titpon*  Dx:   Encounter Diagnosis     ICD-10-CM    1  Lumbar radiculopathy  M54 16    2  Acute left-sided low back pain with left-sided sciatica  M54 42        Start Time: 1402          Subjective: Saw pain mgmt last week  Was taken off mobic and given oral steroids  Pain was much worse with this change  Ended steroids and is back on mobic, which is helping decrease symptoms  Pain is 7/10 currently  Is getting MRI on 10/27      Objective: See treatment diary below      Assessment: No improvements made with sustained or repeated movements today  Pt notes her symptoms improve when her back is "not totally straight" and is in either mild flexion or extension  Sustained loaded flexion continues to produce LLE symptoms  Trialed light core strengthening today and educated pt on how to support all spinal curves while lying in supine  She was able to tolerate begin in this position without increased pain  Instructed pt to try and sleep in this position  Also told her to hold on all repeated movement HEP and updated HEP to include TA activation work  Tolerated treatment well  Patient demonstrated fatigue post treatment and would benefit from continued PT      Plan: Continue per plan of care  Precautions: Lumbar pain with LLE radiation --- extension bias  Other factors: h/o lumbar pain  Pt goals: able to bend and lift from the floor for return to work ASAP      HEP:  Access Code: [email or web address]  URL: [email or web address]  Date: 10/18/2022  Prepared by: Carla Delacruz    Exercises  · Supine Transversus Abdominis Bracing - Hands on Stomach - 20 reps - 5 hold  · Supine Hip Adduction Isometric with Ball - 20 reps - 5 hold  · Hooklying Abduction with Resistance - 20 reps - 5 hold          Manuals 9/27 9/29 10/4 10/6 10/11 10/18       Lumbar gapping  p! held Lumbar STM  R paraspin JAYDEN held          Lumbar unilat PA  L caused P! held                       Neuro Re-Ed                                                                                                        Ther Ex             UBE  1' fwd 1' bwd x6'           Prone lying 5'    1'        ROSS reps x5 x20 x20 x20 3x20 3x20       Lumbar SB arom   R only 3x20 R only 3x20  R only 3x20       Lumbar ext arom   3x20 3x20         Prone glute set  20" x3 20"x3   20"x3       PHE  Alt 2x10 ea held          Standing hip ext    x10 ea         Supine lying w spinal curves supported      2'       TA activation      5"x20       Hip add      H/L ball 5"x20       Hip abd      H/L btb 5"x20                    circuit   3 rds:  1' UBE  Row x10  Chest press x10 4 rds:  1' UBE  Row x15  Chest press x15                                                             Ther Activity                                       Gait Training                                       Modalities             MHP  prone + HOB elev 10' prone + HOB elev 10' prone + HOB elev 10' prone + HOB elev 10' prone + HOB elev 10'

## 2022-10-18 NOTE — TELEPHONE ENCOUNTER
"--FYI--    S/W pt.  Advised pt SL stated \"We do not provide unable to work notes.\"  Advised pt to speak with her PCP.  She stated the ortho note was good to see seen SPA.  Advised her SPA did not take her out to work.  Advised can reach out to ortho/PCP.   "

## 2022-10-18 NOTE — TELEPHONE ENCOUNTER
Caller: Oliva Guzman    Doctor/Office: Dr Duggan/ [facility]    #: [phone]    Work or school note: work note     Return to work/school date: Patient states unable to walk needs work note     Fax #:     Is there any restrictions that need to be updated? If so, what?     I did advise patient our Dr's do not give work note to stay out of work. She states nurse needs to explain.

## 2022-10-18 NOTE — TELEPHONE ENCOUNTER
Caller: Patient    Doctor/Office: Skyler    #:     Work or school note: Work Note    Return to work/school date: Patient states her orthopedic work note  on 10/12 and now her FMLA also   She was [facility] to [facility] but states they advised her they do not complete paperwork or write doctor's notes and to reach out to orthpedics  Patient doesn't want to lose her job and would like to know if we can complete any of this for her    Fax #: n/a    Is there any restrictions that need to be updated? If so, what?

## 2022-10-18 NOTE — TELEPHONE ENCOUNTER
Caller: Patient    Doctor: Halima    Reason for call: calling the nurse back about the work note    Call back#: [phone]

## 2022-10-20 ENCOUNTER — APPOINTMENT (OUTPATIENT)
Dept: PHYSICAL THERAPY | Facility: CLINIC | Age: 47
End: 2022-10-20

## 2022-10-21 ENCOUNTER — TELEPHONE (OUTPATIENT)
Dept: OBGYN CLINIC | Facility: CLINIC | Age: 47
End: 2022-10-21

## 2022-10-21 NOTE — TELEPHONE ENCOUNTER
I talked to Lizbeth More about this patient and will not complete paperwork for a patient that isn't under his care  I called and LM on phone number provided in patient's chart  I explained the above to her that we can't complete paperwork for a patient not under our care  I did ask that she schedule with her PC,P to see if they would provide her a note to be out of work   I did leave the back line to the office in case she would have any questions

## 2022-10-25 ENCOUNTER — APPOINTMENT (OUTPATIENT)
Dept: PHYSICAL THERAPY | Facility: CLINIC | Age: 47
End: 2022-10-25

## 2022-10-27 ENCOUNTER — TELEPHONE (OUTPATIENT)
Dept: OBGYN CLINIC | Facility: CLINIC | Age: 47
End: 2022-10-27

## 2022-10-27 ENCOUNTER — APPOINTMENT (OUTPATIENT)
Dept: PHYSICAL THERAPY | Facility: CLINIC | Age: 47
End: 2022-10-27

## 2022-10-28 NOTE — TELEPHONE ENCOUNTER
I called patient and LM after speaking with Deanna Vázquez  I explained in my message that she was referred to pain management and no longer under his care and he wouldn't be able to provide a note   I ask her to make an appt with her PCP, to have them provide her with a work note or fill out any disability paperwork that she may need at this time

## 2022-10-31 ENCOUNTER — TELEPHONE (OUTPATIENT)
Dept: PAIN MEDICINE | Facility: MEDICAL CENTER | Age: 47
End: 2022-10-31

## 2022-10-31 NOTE — TELEPHONE ENCOUNTER
Santana Aburto,   [facility]  MRI reveals moderate disc at L5-S1, is her pain unilateral or bilateral, would consider TFESI

## 2022-10-31 NOTE — TELEPHONE ENCOUNTER
S/w pt and advised of same  Pt verbalized understanding  Per pt her pain is bilateral and pt is interested in having a TFESI      Pt denies taking any anticoagulants-

## 2022-11-02 NOTE — TELEPHONE ENCOUNTER
PRE OP INSTRUCTIONS:     -If you are on prescription blood thinners, you may have to hold the medication for several days before the procedure  Please call the office to    discuss medication holds at [phone]   -Do not eat or drink ONE HOUR prior to your procedure  If you are diabetic, may follow regular breakfast/lunch schedule and take usual    diabetic medications   -Lumbar( low back) procedure, please wear comfortable slacks/pants   -Cervical (neck) procedure, please wear a shirt/blouse that is easy to remove   -A  is required to take you home form your procedure   -Continue all to take prescribed medication the day of your procedure, including blood pressure medications   -If you are prescribe antibiotics, have an active infection or have an open wound, please contact the office at [phone]   -Please refrain from any vaccinations two weeks before and two weeks after injection   -Insurance authorization received in not a guarantee of payment per your insurance company's authorization disclaimer and it is    your responsibility to verify your benefits  -If you have any questions about the instructions, please call me at [phone]    Hold medication  x _ full days prior, last dose on _  Patient advised to hold medication from Date till their appointment at that time instructions to restart will be given  Patient stated verbal understanding  Aware that nursing will call her to review hold dates as well

## 2022-11-09 ENCOUNTER — TELEPHONE (OUTPATIENT)
Dept: PAIN MEDICINE | Facility: CLINIC | Age: 47
End: 2022-11-09

## 2022-11-09 NOTE — TELEPHONE ENCOUNTER
Jose Negrete took patient out of work  Only saw Dr Jojo Alfonso one fadumo   Ppw is for [employer] [facility] [email or web address] [facility]

## 2022-11-14 ENCOUNTER — HOSPITAL ENCOUNTER (OUTPATIENT)
Dept: RADIOLOGY | Facility: CLINIC | Age: 47
Discharge: HOME | End: 2022-11-14

## 2022-11-14 VITALS
SYSTOLIC BLOOD PRESSURE: 151 MMHG | RESPIRATION RATE: 16 BRPM | HEART RATE: 81 BPM | OXYGEN SATURATION: 95 % | TEMPERATURE: 97.1 F | DIASTOLIC BLOOD PRESSURE: 96 MMHG

## 2022-11-14 DIAGNOSIS — M51.26 LUMBAR DISC HERNIATION: ICD-10-CM

## 2022-11-14 RX ORDER — METHYLPREDNISOLONE ACETATE 80 MG/ML
160 INJECTION, SUSPENSION INTRA-ARTICULAR; INTRALESIONAL; INTRAMUSCULAR; PARENTERAL; SOFT TISSUE ONCE
Status: COMPLETED | OUTPATIENT
Start: 2022-11-14 | End: 2022-11-14

## 2022-11-14 RX ADMIN — IOHEXOL 1 ML: 300 INJECTION, SOLUTION INTRAVENOUS at 09:50

## 2022-11-14 RX ADMIN — METHYLPREDNISOLONE ACETATE 160 MG: 80 INJECTION, SUSPENSION INTRA-ARTICULAR; INTRALESIONAL; INTRAMUSCULAR; SOFT TISSUE at 09:50

## 2022-11-14 NOTE — H&P
History of Present Illness: The patient is a 52 y o  female who presents with complaints of low back and leg pain  Past Medical History:   Diagnosis Date   • Inflammatory bowel disease        Past Surgical History:   Procedure Laterality Date   • CHOLECYSTECTOMY           Current Outpatient Medications:   •  cyclobenzaprine (FLEXERIL) 10 mg tablet, Take 10 mg by mouth 3 (three) times a day as needed, Disp: , Rfl:   •  Levonorgestrel (MIRENA, 52 MG, IU), by Intrauterine route, Disp: , Rfl:   •  meloxicam (Mobic) 15 mg tablet, Take 1 tablet (15 mg total) by mouth daily, Disp: 30 tablet, Rfl: 0  •  predniSONE 10 mg tablet, Take according to titration schedule, Disp: 21 tablet, Rfl: 0    Current Facility-Administered Medications:   •  iohexol (OMNIPAQUE) 300 mg/mL injection 50 mL, 50 mL, Epidural, Once, Tahir Varghese DO  •  methylPREDNISolone acetate (DEPO-MEDROL) injection 160 mg, 160 mg, Epidural, Once, Tahir Varghese DO    Allergies   Allergen Reactions   • Zinc Hives and Shortness Of Breath   • Gabapentin Syncope   • Sulfa Antibiotics Hives   • Sulfasalazine Hives   • Penicillins Hives       Physical Exam:   Vitals:    11/14/22 0937   BP: 166/96   Pulse: 82   Resp: 18   Temp: (!) 97 1 °F (36 2 °C)   SpO2: 97%     General: Awake, Alert, Oriented x 3, Mood and affect appropriate  Respiratory: Respirations even and unlabored  Cardiovascular: Peripheral pulses intact; no edema  Musculoskeletal Exam:  Decreased range of motion lumbar spine    ASA Score: III    Patient/Chart Verification  Patient ID Verified: Verbal  ID Band Applied: No  Consents Confirmed: Procedural  H&P( within 30 days) Verified: To be obtained in the Pre-Procedure area  Interval H&P(within 24 hr) Complete (required for Outpatients and Surgery Admit only): To be obtained in the Pre-Procedure area  Allergies Reviewed:  Yes  Anticoag/NSAID held?: No  Currently on antibiotics?: No  Pre-op Lab/Test Results Available: N/A  Pregnancy Lab Collected: N/A comment    Assessment:   1   Lumbar disc herniation        Plan: B/L S1 TFESI

## 2022-11-14 NOTE — DISCHARGE INSTRUCTIONS
Epidural Steroid Injection   WHAT YOU NEED TO KNOW:   An epidural steroid injection (JOVANNI) is a procedure to inject steroid medicine into the epidural space  The epidural space is between your spinal cord and vertebrae  Steroids reduce inflammation and fluid buildup in your spine that may be causing pain  You may be given pain medicine along with the steroids  ACTIVITY  Do not drive or operate machinery today  No strenuous activity today - bending, lifting, etc   You may resume normal activites starting tomorrow - start slowly and as tolerated  You may shower today, but no tub baths or hot tubs  You may have numbness for several hours from the local anesthetic  Please use caution and common sense, especially with weight-bearing activities  CARE OF THE INJECTION SITE  If you have soreness or pain, apply ice to the area today (20 minutes on/20 minutes off)  Starting tomorrow, you may use warm, moist heat or ice if needed  You may have an increase or change in your discomfort for 36-48 hours after your treatment  Apply ice and continue with any pain medication you have been prescribed  Notify the [facility] if you have any of the following: redness, drainage, swelling, headache, stiff neck or fever above 100°F     SPECIAL INSTRUCTIONS  Our office will contact you in approximately 7 days for a progress report  MEDICATIONS  Continue to take all routine medications  Our office may have instructed you to hold some medications  As no general anesthesia was used in today's procedure, you should not experience any side effects related to anesthesia  If you are diabetic, the steroids used in today's injection may temporarily increase your blood sugar levels after the first few days after your injection  Please keep a close eye on your sugars and alert the doctor who manages your diabetes if your sugars are significantly high from your baseline or you are symptomatic       If you have a problem specifically related to your procedure, please call our office at [phone]  Problems not related to your procedure should be directed to your primary care physician

## 2022-11-21 ENCOUNTER — TELEPHONE (OUTPATIENT)
Dept: PAIN MEDICINE | Facility: CLINIC | Age: 47
End: 2022-11-21

## 2022-12-01 ENCOUNTER — TELEPHONE (OUTPATIENT)
Dept: PAIN MEDICINE | Facility: CLINIC | Age: 47
End: 2022-12-01

## 2022-12-27 ENCOUNTER — OFFICE VISIT (OUTPATIENT)
Dept: PAIN MEDICINE | Facility: CLINIC | Age: 47
End: 2022-12-27

## 2022-12-27 VITALS
DIASTOLIC BLOOD PRESSURE: 88 MMHG | WEIGHT: 236 LBS | TEMPERATURE: 97.8 F | HEART RATE: 81 BPM | HEIGHT: 66 IN | SYSTOLIC BLOOD PRESSURE: 140 MMHG | BODY MASS INDEX: 37.93 KG/M2

## 2022-12-27 DIAGNOSIS — G89.29 CHRONIC BILATERAL LOW BACK PAIN WITHOUT SCIATICA: Primary | ICD-10-CM

## 2022-12-27 DIAGNOSIS — M51.26 LUMBAR DISC HERNIATION: ICD-10-CM

## 2022-12-27 DIAGNOSIS — M54.16 LUMBAR RADICULOPATHY: ICD-10-CM

## 2022-12-27 DIAGNOSIS — M54.50 CHRONIC BILATERAL LOW BACK PAIN WITHOUT SCIATICA: Primary | ICD-10-CM

## 2022-12-27 NOTE — PROGRESS NOTES
Assessment:  1  Chronic bilateral low back pain without sciatica    2  Lumbar radiculopathy    3  Lumbar disc herniation        Plan:  I discussed with the patient that since there has been mod-sig improvement in the pain symptoms that we will hold off on any repeat injections at this point in time  However, I did explain that if over the next 4-6 weeks the pain symptoms should return, worsen, and/or change, we could consider a repeat injection  If after the 6-8 weeks an office visit would be warranted for re-evaluation  The patient was agreeable and verbalized an understanding  I encouraged the patient continue with her home exercise instruction program, utilizing 20 minutes of low heat prior to her home exercises and stretching and to slowly and steadily increase her activity, as tolerated, allowing pain to be her guide  The patient was agreeable and verbalized an understanding  I discussed with the patient that at this point time she can follow up with our office on an as-needed basis  I did review the patient that if her pain symptoms should change, worsen, and/or if she would experience any new symptoms she would like to be evaluated for, she should give our office a call  The patient was agreeable and verbalized an understanding  History of Present Illness: The patient is a 52 y o  female last seen on 11/14/2022 who presents for a follow up office visit in regards to chronic low back pain with radiculopathy secondary to lumbar disc herniation  The patient currently reports that at this point time she is noting significant and stable overall relief and improvement of her low back pain and lower extremity radicular symptoms as she is status post a bilateral S1 transforaminal epidural*injection on November 14, 2022 with Dr Sherwin Reyes    The patient reports that since the injection she no longer has the lower extremity radicular symptoms as a result of the injection for the first 3 weeks after the injection she had absolutely no pain, even in her back  However, after the 3 weeks the low back pain has returned but is definitely had a significantly improved and stable level and at this point she feels she is doing well she continues with her home exercise and stretching program   She reports that over-the-counter medication is enough to manage her pain as typically by the end of the day and after work it does seem to worsen but is still quite manageable  The patient presents today for regular postprocedure follow-up visit  I have personally reviewed and/or updated the patient's past medical history, past surgical history, family history, social history, current medications, allergies, and vital signs today  Review of Systems:    Review of Systems   Respiratory: Negative for shortness of breath  Cardiovascular: Negative for chest pain  Gastrointestinal: Negative for constipation, diarrhea, nausea and vomiting  Musculoskeletal: Negative for arthralgias, gait problem, joint swelling and myalgias  Skin: Negative for rash  Neurological: Negative for dizziness, seizures and weakness  All other systems reviewed and are negative  Past Medical History:   Diagnosis Date   • Inflammatory bowel disease        Past Surgical History:   Procedure Laterality Date   • CHOLECYSTECTOMY         No family history on file      Social History     Occupational History   • Not on file   Tobacco Use   • Smoking status: Former     Types: Cigarettes   • Smokeless tobacco: Never   Substance and Sexual Activity   • Alcohol use: Not on file   • Drug use: Not on file   • Sexual activity: Not on file         Current Outpatient Medications:   •  cyclobenzaprine (FLEXERIL) 10 mg tablet, Take 10 mg by mouth 3 (three) times a day as needed, Disp: , Rfl:   •  Levonorgestrel (MIRENA, 52 MG, IU), by Intrauterine route, Disp: , Rfl:     Allergies   Allergen Reactions   • Zinc Hives and Shortness Of Breath   • Gabapentin Syncope   • Sulfa Antibiotics Hives   • Sulfasalazine Hives   • Penicillins Hives       Physical Exam:    /88 (BP Location: Left arm, Patient Position: Sitting, Cuff Size: Large)   Pulse 81   Temp 97 8 °F (36 6 °C)   Ht 5' 6" (1 676 m)   Wt 107 kg (236 lb)   BMI 38 09 kg/m²     Constitutional:normal, well developed, well nourished, alert, in no distress and non-toxic and no overt pain behavior  and overweight  Eyes:anicteric  HEENT:grossly intact  Neck:supple, symmetric, trachea midline and no masses   Pulmonary:even and unlabored  Cardiovascular:No edema or pitting edema present  Skin:Normal without rashes or lesions and well hydrated  Psychiatric:Mood and affect appropriate  Neurologic:Cranial Nerves II-XII grossly intact  Musculoskeletal:normal      Imaging  No orders to display         No orders of the defined types were placed in this encounter

## 2023-04-27 NOTE — PROGRESS NOTES
2023  Indu Pringle is a 47 y.o. female who presents for annual exam.      Daughter diagnosed with Crohn's at age 17    Moved to [place]  Live with BF and 2 kids  Work at [employer]   Gyn History:    No LMP recorded. Patient has had an implant.   Scant spotting with Mirena   Sexually active, no pain/ bleeding   Contraception Mirena 2019  Pap 2018 neg cytology/ HPV   Mammo- 3/2021- needle biopsy of left breast - fibroadenoma   Dexa NA   Colonoscopy- plans at 50          OB History:   OB History    Para Term  AB Living   3 3 3 0 0 0   SAB IAB Ectopic Multiple Live Births   0 0 0 0 0      # Outcome Date GA Lbr Augustin/2nd Weight Sex Delivery Anes PTL Lv   3 Term            2 Term            1 Term               Obstetric Comments   3 FTSVDS   No gHTN/ gDM         Medical History:   Past Medical History:   Diagnosis Date   • Arthritis     knees   • GERD without esophagitis    • IBS (irritable bowel syndrome)     w diarrhea   • Migraines     prior to or after periods, no aura        Surgical History:   Past Surgical History:   Procedure Laterality Date   • BREAST BIOPSY Left     3/21 neg   • CHOLECYSTECTOMY     • TONSILLECTOMY         Allergies: Penicillins, Sulfasalazine, and Zinc    Prior to Admission medications    Medication Sig Start Date End Date Taking? Authorizing Provider   cyclobenzaprine (FLEXERIL) 10 mg tablet Take 1 tablet (10 mg total) by mouth 3 (three) times a day as needed for muscle spasms for up to 10 days. 8/3/21 8/13/21  Amy Morris DO        Social History:   Social History     Socioeconomic History   • Marital status: Single     Spouse name: None   • Number of children: 3   • Years of education: None   • Highest education level: None   Occupational History   • Occupation: manages [employer]   Tobacco Use   • Smoking status: Former     Packs/day: 0.75     Years: 15.00     Pack years: 11.25     Types: Cigarettes   • Smokeless tobacco: Never   • Tobacco comments:  "    quit 5/2018   Substance and Sexual Activity   • Alcohol use: No   • Drug use: No        Family History:   Family History   Problem Relation Age of Onset   • Hypertension Biological Mother    • Arthritis Biological Mother    • Melanoma Biological Mother    • Lung cancer Maternal Grandfather    • Melanoma Maternal Grandfather    • Breast cancer Neg Hx    • Colon cancer Neg Hx    • Ovarian cancer Neg Hx        Review of Systems and Physical Exam  The following have been reviewed and updated as appropriate in this visit:  Allergies  Meds  Problems       Visit Vitals  /76   Ht 1.676 m (5' 6\")   Wt 93.4 kg (206 lb)   BMI 33.25 kg/m²     HPI  Review of Systems  Physical Exam  Constitutional:       Appearance: She is well-developed.     Genitourinary:    Urethra, bladder, vagina, cervix, uterus, urethral meatus, external female genitalia and adnexa normal.      Cervical IUD strings present.   Uterine contour is regular.   Neck:      Thyroid: No thyromegaly.   Pulmonary:      Effort: Pulmonary effort is normal.   Chest:   Breasts:     Right: No inverted nipple, mass, nipple discharge, skin change or tenderness.      Left: No inverted nipple, mass, nipple discharge, skin change or tenderness.   Abdominal:      General: There is no distension.      Palpations: Abdomen is soft. There is no mass.      Tenderness: There is no abdominal tenderness. There is no rebound.   Musculoskeletal:      Cervical back: Normal range of motion and neck supple.   Neurological:      Mental Status: She is alert and oriented to person, place, and time.   Psychiatric:         Behavior: Behavior normal.         Thought Content: Thought content normal.         Diagnoses and all orders for this visit:    Well woman exam with routine gynecological exam  -     PAP AND HR HPV W/REFLEX TO GENOTYPING 16,18/45    Breast cancer screening by mammogram  -     BI SCREENING MAMMOGRAM BILATERAL(TOMOSYNTHESIS)    Nancy Garner MD        "

## 2023-05-01 ENCOUNTER — OFFICE VISIT (OUTPATIENT)
Dept: OBSTETRICS AND GYNECOLOGY | Facility: CLINIC | Age: 48
End: 2023-05-01
Payer: COMMERCIAL

## 2023-05-01 VITALS
BODY MASS INDEX: 33.11 KG/M2 | HEIGHT: 66 IN | SYSTOLIC BLOOD PRESSURE: 140 MMHG | DIASTOLIC BLOOD PRESSURE: 76 MMHG | WEIGHT: 206 LBS

## 2023-05-01 DIAGNOSIS — Z12.31 BREAST CANCER SCREENING BY MAMMOGRAM: ICD-10-CM

## 2023-05-01 DIAGNOSIS — Z01.419 WELL WOMAN EXAM WITH ROUTINE GYNECOLOGICAL EXAM: Primary | ICD-10-CM

## 2023-05-01 PROCEDURE — 3008F BODY MASS INDEX DOCD: CPT | Performed by: OBSTETRICS & GYNECOLOGY

## 2023-05-01 PROCEDURE — 99386 PREV VISIT NEW AGE 40-64: CPT | Performed by: OBSTETRICS & GYNECOLOGY

## 2023-05-01 RX ORDER — LEVONORGESTREL 52 MG/1
1 INTRAUTERINE DEVICE INTRAUTERINE ONCE
COMMUNITY

## 2023-05-03 LAB
CYTOLOGIST CVX/VAG CYTO: NORMAL
CYTOLOGY CVX/VAG DOC CYTO: NORMAL
CYTOLOGY CVX/VAG DOC THIN PREP: NORMAL
DX ICD CODE: NORMAL
HPV I/H RISK 4 DNA CVX QL PROBE+SIG AMP: NEGATIVE
LAB CORP NOTE:: NORMAL
LC HPV GENOTYPE REFLEX: NORMAL
OTHER STN SPEC: NORMAL
STAT OF ADQ CVX/VAG CYTO-IMP: NORMAL

## 2023-07-07 ENCOUNTER — APPOINTMENT (OUTPATIENT)
Dept: RADIOLOGY | Facility: HOSPITAL | Age: 48
End: 2023-07-07
Payer: COMMERCIAL

## 2023-07-07 ENCOUNTER — HOSPITAL ENCOUNTER (EMERGENCY)
Facility: HOSPITAL | Age: 48
Discharge: HOME | End: 2023-07-07
Attending: EMERGENCY MEDICINE
Payer: COMMERCIAL

## 2023-07-07 VITALS
RESPIRATION RATE: 18 BRPM | TEMPERATURE: 97.9 F | OXYGEN SATURATION: 96 % | SYSTOLIC BLOOD PRESSURE: 145 MMHG | DIASTOLIC BLOOD PRESSURE: 73 MMHG | HEART RATE: 81 BPM

## 2023-07-07 DIAGNOSIS — R07.89 NON-CARDIAC CHEST PAIN: Primary | ICD-10-CM

## 2023-07-07 LAB
ALBUMIN SERPL BCP-MCNC: 4.2 G/DL (ref 3.5–5)
ALP SERPL-CCNC: 51 U/L (ref 34–104)
ALT SERPL W P-5'-P-CCNC: 10 U/L (ref 7–52)
ANION GAP SERPL CALCULATED.3IONS-SCNC: 6 MMOL/L
AST SERPL W P-5'-P-CCNC: 13 U/L (ref 13–39)
BASOPHILS # BLD AUTO: 0.01 THOUSANDS/ÂΜL (ref 0–0.1)
BASOPHILS NFR BLD AUTO: 0 % (ref 0–1)
BILIRUB SERPL-MCNC: 0.61 MG/DL (ref 0.2–1)
BUN SERPL-MCNC: 10 MG/DL (ref 5–25)
CALCIUM SERPL-MCNC: 9.1 MG/DL (ref 8.4–10.2)
CARDIAC TROPONIN I PNL SERPL HS: <2 NG/L
CHLORIDE SERPL-SCNC: 108 MMOL/L (ref 96–108)
CO2 SERPL-SCNC: 23 MMOL/L (ref 21–32)
CREAT SERPL-MCNC: 0.76 MG/DL (ref 0.6–1.3)
EOSINOPHIL # BLD AUTO: 0.15 THOUSAND/ÂΜL (ref 0–0.61)
EOSINOPHIL NFR BLD AUTO: 5 % (ref 0–6)
ERYTHROCYTE [DISTWIDTH] IN BLOOD BY AUTOMATED COUNT: 12.6 % (ref 11.6–15.1)
GFR SERPL CREATININE-BSD FRML MDRD: 93 ML/MIN/1.73SQ M
GLUCOSE SERPL-MCNC: 107 MG/DL (ref 65–140)
HCT VFR BLD AUTO: 39.8 % (ref 34.8–46.1)
HGB BLD-MCNC: 13.3 G/DL (ref 11.5–15.4)
IMM GRANULOCYTES # BLD AUTO: 0.01 THOUSAND/UL (ref 0–0.2)
IMM GRANULOCYTES NFR BLD AUTO: 0 % (ref 0–2)
LYMPHOCYTES # BLD AUTO: 1.54 THOUSANDS/ÂΜL (ref 0.6–4.47)
LYMPHOCYTES NFR BLD AUTO: 47 % (ref 14–44)
MCH RBC QN AUTO: 30.3 PG (ref 26.8–34.3)
MCHC RBC AUTO-ENTMCNC: 33.4 G/DL (ref 31.4–37.4)
MCV RBC AUTO: 91 FL (ref 82–98)
MONOCYTES # BLD AUTO: 0.23 THOUSAND/ÂΜL (ref 0.17–1.22)
MONOCYTES NFR BLD AUTO: 7 % (ref 4–12)
NEUTROPHILS # BLD AUTO: 1.36 THOUSANDS/ÂΜL (ref 1.85–7.62)
NEUTS SEG NFR BLD AUTO: 41 % (ref 43–75)
NRBC BLD AUTO-RTO: 0 /100 WBCS
PLATELET # BLD AUTO: 284 THOUSANDS/UL (ref 149–390)
PMV BLD AUTO: 10.4 FL (ref 8.9–12.7)
POTASSIUM SERPL-SCNC: 3.9 MMOL/L (ref 3.5–5.3)
PROT SERPL-MCNC: 6.8 G/DL (ref 6.4–8.4)
RBC # BLD AUTO: 4.39 MILLION/UL (ref 3.81–5.12)
SODIUM SERPL-SCNC: 137 MMOL/L (ref 135–147)
WBC # BLD AUTO: 3.3 THOUSAND/UL (ref 4.31–10.16)

## 2023-07-07 PROCEDURE — 84484 ASSAY OF TROPONIN QUANT: CPT | Performed by: EMERGENCY MEDICINE

## 2023-07-07 PROCEDURE — 93005 ELECTROCARDIOGRAM TRACING: CPT

## 2023-07-07 PROCEDURE — 80053 COMPREHEN METABOLIC PANEL: CPT | Performed by: EMERGENCY MEDICINE

## 2023-07-07 PROCEDURE — 85025 COMPLETE CBC W/AUTO DIFF WBC: CPT | Performed by: EMERGENCY MEDICINE

## 2023-07-07 PROCEDURE — 36415 COLL VENOUS BLD VENIPUNCTURE: CPT

## 2023-07-07 PROCEDURE — 71046 X-RAY EXAM CHEST 2 VIEWS: CPT

## 2023-07-07 PROCEDURE — 99285 EMERGENCY DEPT VISIT HI MDM: CPT

## 2023-07-07 NOTE — ED PROVIDER NOTES
History  Chief Complaint   Patient presents with   • Chest Pain     Pt has chest pain on and off for a couple days. Pt says its a tightness. Pt denies back pain or sob. History from patient. Past medical history hypertension anxiety left rotator cuff injury complains of sternal chest pressure and tightness intermittent for couple days now but then constant all night long last night. Still having the discomfort now. She tried ibuprofen without relief. She was thinking maybe it was her rotator cuff pain radiating into the chest or else some heartburn as she did have some belching. But then she also got anxious and thought maybe this is in her head. Prior to Admission Medications   Prescriptions Last Dose Informant Patient Reported? Taking? Levonorgestrel (MIRENA, 52 MG, IU) 7/7/2023  Yes Yes   Sig: by Intrauterine route      Facility-Administered Medications: None       Past Medical History:   Diagnosis Date   • Inflammatory bowel disease        Past Surgical History:   Procedure Laterality Date   • CHOLECYSTECTOMY         History reviewed. No pertinent family history. I have reviewed and agree with the history as documented. E-Cigarette/Vaping     E-Cigarette/Vaping Substances     Social History     Tobacco Use   • Smoking status: Former     Types: Cigarettes   • Smokeless tobacco: Never   Substance Use Topics   • Alcohol use: Not Currently       Review of Systems   Constitutional: Negative for chills and fever. HENT: Negative for rhinorrhea and sore throat. Respiratory: Positive for chest tightness. Negative for shortness of breath. Cardiovascular: Negative for chest pain. Gastrointestinal: Negative for abdominal pain, constipation, diarrhea, nausea and vomiting. Genitourinary: Negative for dysuria and frequency. Skin: Negative for rash. All other systems reviewed and are negative. Physical Exam  Physical Exam  Vitals and nursing note reviewed.    Constitutional: Appearance: She is well-developed. HENT:      Head: Normocephalic and atraumatic. Right Ear: External ear normal.      Left Ear: External ear normal.      Nose: Nose normal.   Eyes:      Conjunctiva/sclera: Conjunctivae normal.      Pupils: Pupils are equal, round, and reactive to light. Cardiovascular:      Rate and Rhythm: Normal rate and regular rhythm. Heart sounds: Normal heart sounds. Pulmonary:      Effort: Pulmonary effort is normal. No respiratory distress. Breath sounds: Normal breath sounds. No wheezing. Chest:      Chest wall: No deformity or tenderness. Abdominal:      General: Bowel sounds are normal. There is no distension. Palpations: Abdomen is soft. Tenderness: There is no abdominal tenderness. Musculoskeletal:         General: No deformity. Normal range of motion. Cervical back: Normal range of motion and neck supple. No spinous process tenderness. Skin:     General: Skin is warm and dry. Findings: No rash. Neurological:      General: No focal deficit present. Mental Status: She is alert. GCS: GCS eye subscore is 4. GCS verbal subscore is 5. GCS motor subscore is 6. Sensory: No sensory deficit.    Psychiatric:         Mood and Affect: Mood normal.         Vital Signs  ED Triage Vitals   Temperature Pulse Respirations Blood Pressure SpO2   07/07/23 1103 07/07/23 1103 07/07/23 1103 07/07/23 1104 07/07/23 1104   97.9 °F (36.6 °C) 83 18 159/89 98 %      Temp src Heart Rate Source Patient Position - Orthostatic VS BP Location FiO2 (%)   -- -- -- -- --             Pain Score       --                  Vitals:    07/07/23 1103 07/07/23 1104 07/07/23 1249   BP:  159/89 145/73   Pulse: 83  81         Visual Acuity      ED Medications  Medications - No data to display    Diagnostic Studies  Results Reviewed     Procedure Component Value Units Date/Time    HS Troponin 0hr (reflex protocol) [[id number]]  (Normal) Collected: 07/07/23 1108    Lab Status: Final result Specimen: Blood from Arm, Right Updated: 07/07/23 1139     hs TnI 0hr <2 ng/L     Comprehensive metabolic panel [[id number]] Collected: 07/07/23 1108    Lab Status: Final result Specimen: Blood from Arm, Right Updated: 07/07/23 1135     Sodium 137 mmol/L      Potassium 3.9 mmol/L      Chloride 108 mmol/L      CO2 23 mmol/L      ANION GAP 6 mmol/L      BUN 10 mg/dL      Creatinine 0.76 mg/dL      Glucose 107 mg/dL      Calcium 9.1 mg/dL      AST 13 U/L      ALT 10 U/L      Alkaline Phosphatase 51 U/L      Total Protein 6.8 g/dL      Albumin 4.2 g/dL      Total Bilirubin 0.61 mg/dL      eGFR 93 ml/min/1.73sq m     Narrative:      National Kidney Disease Foundation guidelines for Chronic Kidney Disease (CKD):   •  Stage 1 with normal or high GFR (GFR > 90 mL/min/1.73 square meters)  •  Stage 2 Mild CKD (GFR = 60-89 mL/min/1.73 square meters)  •  Stage 3A Moderate CKD (GFR = 45-59 mL/min/1.73 square meters)  •  Stage 3B Moderate CKD (GFR = 30-44 mL/min/1.73 square meters)  •  Stage 4 Severe CKD (GFR = 15-29 mL/min/1.73 square meters)  •  Stage 5 End Stage CKD (GFR <15 mL/min/1.73 square meters)  Note: GFR calculation is accurate only with a steady state creatinine    CBC and differential [[id number]]  (Abnormal) Collected: 07/07/23 1108    Lab Status: Final result Specimen: Blood from Arm, Right Updated: 07/07/23 1115     WBC 3.30 Thousand/uL      RBC 4.39 Million/uL      Hemoglobin 13.3 g/dL      Hematocrit 39.8 %      MCV 91 fL      MCH 30.3 pg      MCHC 33.4 g/dL      RDW 12.6 %      MPV 10.4 fL      Platelets 614 Thousands/uL      nRBC 0 /100 WBCs      Neutrophils Relative 41 %      Immat GRANS % 0 %      Lymphocytes Relative 47 %      Monocytes Relative 7 %      Eosinophils Relative 5 %      Basophils Relative 0 %      Neutrophils Absolute 1.36 Thousands/µL      Immature Grans Absolute 0.01 Thousand/uL      Lymphocytes Absolute 1.54 Thousands/µL      Monocytes Absolute 0.23 Thousand/µL Eosinophils Absolute 0.15 Thousand/µL      Basophils Absolute 0.01 Thousands/µL                  XR chest 2 views   Final Result by Mabel Anderson MD (07/07 1156)      No acute cardiopulmonary disease. Workstation performed: [id number]                    Procedures  ECG 12 Lead Documentation Only    Date/Time: 7/7/2023 2:35 PM    Performed by: Vannesa Urbina DO  Authorized by: Vannesa Urbina DO    Indications / Diagnosis:  Chest pain  ECG reviewed by me, the ED Provider: yes    Patient location:  ED  Previous ECG:     Previous ECG:  Unavailable  Interpretation:     Interpretation: normal    Rate:     ECG rate:  99    ECG rate assessment: normal    Rhythm:     Rhythm: sinus rhythm    Ectopy:     Ectopy: none    QRS:     QRS axis:  Normal    QRS intervals:  Normal  Conduction:     Conduction: normal    ST segments:     ST segments:  Normal  T waves:     T waves: normal    Comments: This EKG was interpreted by me. ED Course             HEART Risk Score    Flowsheet Row Most Recent Value   Heart Score Risk Calculator    History 0 Filed at: 07/07/2023 1714   ECG 0 Filed at: 07/07/2023 1714   Age 1 Filed at: 07/07/2023 1714   Risk Factors 1 Filed at: 07/07/2023 1714   Troponin 0 Filed at: 07/07/2023 1714   HEART Score 2 Filed at: 07/07/2023 1714                        SBIRT 22yo+    Flowsheet Row Most Recent Value   Initial Alcohol Screen: US AUDIT-C     1. How often do you have a drink containing alcohol? 0 Filed at: 07/07/2023 1220   2. How many drinks containing alcohol do you have on a typical day you are drinking? 0 Filed at: 07/07/2023 1220   3a. Male UNDER 65: How often do you have five or more drinks on one occasion? 0 Filed at: 07/07/2023 1220   3b. FEMALE Any Age, or MALE 65+: How often do you have 4 or more drinks on one occassion? 0 Filed at: 07/07/2023 1220   Audit-C Score 0 Filed at: 07/07/2023 1220   ILSA: How many times in the past year have you. ..     Used an illegal drug or used a prescription medication for non-medical reasons? Never Filed at: 07/07/2023 1220                    Medical Decision Making  Differential includes anxiety, heartburn, musculoskeletal chest wall pain, less likely acute coronary syndrome or arrhythmia. Less likely pneumonia pneumothorax or pleural effusions. Amount and/or Complexity of Data Reviewed  Labs: ordered. Radiology: ordered. Disposition  Final diagnoses:   Non-cardiac chest pain     Time reflects when diagnosis was documented in both MDM as applicable and the Disposition within this note     Time User Action Codes Description Comment    7/7/2023 12:37 PM Kindra Avery Add [R07.89] Non-cardiac chest pain       ED Disposition     ED Disposition   Discharge    Condition   Stable    Date/Time   Fri Jul 7, 2023 12:37 PM    Comment   Berenice Ascencio discharge to home/self care. Follow-up Information     Follow up With Specialties Details Why [facility], DO Internal Medicine Schedule an appointment as soon as possible for a visit   [address]  [phone]            Discharge Medication List as of 7/7/2023 12:38 PM      CONTINUE these medications which have NOT CHANGED    Details   Levonorgestrel (MIRENA, 52 MG, IU) by Intrauterine route, Historical Med             No discharge procedures on file.     PDMP Review     None          ED Provider  Electronically Signed by           Marino Jones DO  07/07/23 3297

## 2023-07-08 LAB
ATRIAL RATE: 99 BPM
P AXIS: 69 DEGREES
PR INTERVAL: 152 MS
QRS AXIS: 78 DEGREES
QRSD INTERVAL: 80 MS
QT INTERVAL: 352 MS
QTC INTERVAL: 451 MS
T WAVE AXIS: 71 DEGREES
VENTRICULAR RATE: 99 BPM

## 2023-07-08 PROCEDURE — 93010 ELECTROCARDIOGRAM REPORT: CPT | Performed by: INTERNAL MEDICINE

## 2024-03-06 NOTE — RESULT ENCOUNTER NOTE
Indu,  Here are your labwork results -   Your blood count, thyroid, HIV screen, Hepatitis C screen,blood sugar , kidneys, liver, electrolytes are normal.   Cholesterol and Triglycerides are also normal.   Have a good rest of your day!   - Dr HERRERA No

## 2024-10-11 ENCOUNTER — TRANSCRIBE ORDERS (OUTPATIENT)
Dept: SCHEDULING | Age: 49
End: 2024-10-11

## 2024-10-11 DIAGNOSIS — N61.0 MASTITIS WITHOUT ABSCESS: Primary | ICD-10-CM

## 2024-10-22 ENCOUNTER — HOSPITAL ENCOUNTER (OUTPATIENT)
Dept: RADIOLOGY | Age: 49
Discharge: HOME | End: 2024-10-22
Attending: INTERNAL MEDICINE
Payer: COMMERCIAL

## 2024-10-22 ENCOUNTER — TRANSCRIBE ORDERS (OUTPATIENT)
Dept: RADIOLOGY | Age: 49
End: 2024-10-22

## 2024-10-22 DIAGNOSIS — N61.0 MASTITIS WITHOUT ABSCESS: ICD-10-CM

## 2024-10-22 DIAGNOSIS — N64.4 MASTODYNIA: ICD-10-CM

## 2024-10-22 DIAGNOSIS — N64.4 MASTODYNIA: Primary | ICD-10-CM

## 2024-10-22 PROCEDURE — 76642 ULTRASOUND BREAST LIMITED: CPT | Mod: 50

## 2024-10-22 PROCEDURE — G0279 TOMOSYNTHESIS, MAMMO: HCPCS

## 2024-10-31 ENCOUNTER — OFFICE VISIT (OUTPATIENT)
Dept: SURGERY | Facility: CLINIC | Age: 49
End: 2024-10-31
Payer: COMMERCIAL

## 2024-10-31 VITALS
TEMPERATURE: 97.7 F | SYSTOLIC BLOOD PRESSURE: 108 MMHG | OXYGEN SATURATION: 99 % | WEIGHT: 156 LBS | HEART RATE: 80 BPM | HEIGHT: 66 IN | DIASTOLIC BLOOD PRESSURE: 70 MMHG | BODY MASS INDEX: 25.07 KG/M2

## 2024-10-31 DIAGNOSIS — Z80.3 FAMILY HISTORY OF MALIGNANT NEOPLASM OF BREAST: ICD-10-CM

## 2024-10-31 DIAGNOSIS — N63.0 PALPABLE MASS OF BREAST: ICD-10-CM

## 2024-10-31 DIAGNOSIS — R92.30 BREAST DENSITY: Primary | ICD-10-CM

## 2024-10-31 PROCEDURE — 99203 OFFICE O/P NEW LOW 30 MIN: CPT | Performed by: NURSE PRACTITIONER

## 2024-10-31 PROCEDURE — 3008F BODY MASS INDEX DOCD: CPT | Performed by: NURSE PRACTITIONER

## 2024-10-31 RX ORDER — TIRZEPATIDE 10 MG/.5ML
10 INJECTION, SOLUTION SUBCUTANEOUS
COMMUNITY
Start: 2024-09-12

## 2024-10-31 NOTE — PROGRESS NOTES
[facility]  [address] [place] [address]  Phone: [phone]  Fax: [phone]      Patient ID: Indu Pringle                              : 1975    Visit Date: 10/31/2024  Referring Provider: Mariya Espinoza MD   PCP: Monik Tellez DO  GYN: No care team member to display    Chief Complaint: Abnormal Breast Imaging      Indu Pringle is a 49 y.o.  female who presents to the office in consultation for abnormal imaging.  Patient tells me that approximately 3 to 4 weeks ago she developed bilateral breast heaviness, soreness, and lumpiness to the  outer quadrants.  She was evaluated by her primary care who noted a rash to the intramammary fold.  She was given clindamycin antibiotics for 2 weeks and reports resolution of her rash, heaviness, and most of her pain.  She occasionally endorses some tenderness when things brush against her breast.  She denies any nipple discharge, skin changes, skin retraction or dimpling.  She can no longer discretely feel any masses.  She was previously seen by Dr. Spencer and underwent a left breast ultrasound-guided biopsy on 3/8/2021 that revealed portion of fibroadenoma, duct ectasia, papillary apocrine hyperplasia.  She has a family history of breast cancer in her mother diagnosed last year at the age of 73, genetic testing was found to be negative.       Breast Health:  Age at menarche: 9  Age at first live birth: 23   Age of menopause: No age on file   , did not breastfeed  Allergies: Penicillins, Sulfasalazine, and Zinc    Current Medications: has a current medication list which includes the following prescription(s): mirena and mounjaro.    Past Medical History:  has a past medical history of Arthritis, GERD without esophagitis, IBS (irritable bowel syndrome), Migraines, and Type 2 diabetes mellitus (CMS/HCC).    Past Surgical History:  has a past surgical history that includes Cholecystectomy; Tonsillectomy; and Breast biopsy  "(Left).    Social History:   Social History     Tobacco Use    Smoking status: Former     Current packs/day: 0.75     Average packs/day: 0.8 packs/day for 15.0 years (11.3 ttl pk-yrs)     Types: Cigarettes    Smokeless tobacco: Never    Tobacco comments:     quit 5/2018   Vaping Use    Vaping status: Former   Substance Use Topics    Alcohol use: No    Drug use: No       Family History: family history includes Arthritis in her biological mother; Breast cancer (age of onset: 73) in her biological mother; Crohn's disease in her biological daughter; Hypertension in her biological mother; Lung cancer in her maternal grandfather; Melanoma in her biological mother and maternal grandfather.        Physical Exam:    Vitals: Visit Vitals  /70 (BP Location: Left upper arm, Patient Position: Sitting)   Pulse 80   Temp 36.5 °C (97.7 °F) (Temporal)   Ht 1.676 m (5' 6\")   Wt 70.8 kg (156 lb) Comment: with shoes   SpO2 99%   BMI 25.18 kg/m²     Body mass index is 25.18 kg/m².    Physical Exam  Physical Examination performed in the supine and sitting position  BREAST SIZE: Moderate C cup  SYMMETRY yes       SKIN - Skin color, texture, turgor normal. No rashes or lesions Skin is without tethering, dimpling, retraction or increased vascularity  AREOLA - normal    NIPPLES -  normal without retraction and without discharge  LYMPH NODES: infra, supra, cervical, parasternal and axillary nodes normal bilaterally  BREAST TISSUE: Extremely dense nodular breast tissue limiting exam.  Right breast palpable 1 cm nodular mass 8:30-9:00 6cmfn. . Left Breast without discrete lesions.       Breast Imaging: I have personally reviewed the reports and images as follows.  Bilateral diagnostic mammogram and ultrasound performed 10/22/2024, type D breast density.  Incidentally noted right breast  8 o'clock position 6cmfn there is an oval circumscribed 1.4 cm area of heterogeneous tissue versus possible mass.  Given bilateral breast pain and " lumpiness, MRI should be considered, in the absence of suspicious findings in the area on breast MRI, short-term follow-up ultrasound is recommended in 6 months.  BI-RADS 0  Impression:  49-year-old female at high risk for breast cancer  Abnormal mammogram  Right breast palpable mass  Type D breast density  Recommendation and Plan:   Patient tells me she is hesitant to undergo MRI screening as she prefer to defer biopsies unless they are truly indicated  Tyrer-Cuzick score was calculated to be  23.4%, patient is aware that this may be an over estimation as she did not have information regarding her paternal family history    After further discussion, shared decision was made for patient to undergo baseline MRI screening to further evaluate her dense nodular tissue  Reviewed with patient that MRIs have a high sensitivity but a low specificity and therefore there is a significant risk of false-positive results requiring call backs for additional imaging and breast biopsies  We did discussed the possibility of gadolinium deposition and that currently there is no evidence to suggest that this is harmful    If MRI is benign, patient will obtain a right breast ultrasound to confirm stability of 1.4 cm oval circumscribed mass  She will be followed by our office annually for clinical exams and imaging review, MRIs can be incorporated into her imaging in the future on an as-needed basis  Advised patient to perform self breast exams, she was instructed to return to office sooner with any breast changes or concerns     All of the questions were answered.  The patient is in agreement with the treatment plan.            10/31/2024   2:58 PM      ANUSHKA Cates

## 2024-11-15 ENCOUNTER — OFFICE VISIT (OUTPATIENT)
Dept: URGENT CARE | Facility: CLINIC | Age: 49
End: 2024-11-15
Payer: COMMERCIAL

## 2024-11-15 VITALS
HEART RATE: 75 BPM | TEMPERATURE: 97.5 F | OXYGEN SATURATION: 99 % | RESPIRATION RATE: 16 BRPM | DIASTOLIC BLOOD PRESSURE: 73 MMHG | SYSTOLIC BLOOD PRESSURE: 127 MMHG

## 2024-11-15 DIAGNOSIS — M67.442 GANGLION CYST OF FINGER OF LEFT HAND: Primary | ICD-10-CM

## 2024-11-15 PROCEDURE — 99213 OFFICE O/P EST LOW 20 MIN: CPT | Performed by: FAMILY MEDICINE

## 2024-11-15 NOTE — PROGRESS NOTES
[facility]        NAME: Oliva Saleem is a 49 y.o. female  : 1975    MRN: [id number]  DATE: November 15, 2024  TIME: 3:09 PM    Assessment and Plan   Ganglion cyst of finger of left hand [M67.442]  1. Ganglion cyst of finger of left hand  Ambulatory referral to Orthopedic Surgery            Patient Instructions       Follow up with PCP in 3-5 days.  Proceed to  ER if symptoms worsen.    If tests have been performed at [facility], our office will contact you with results if changes need to be made to the care plan discussed with you at the visit.  [facility]    Chief Complaint     Chief Complaint   Patient presents with    bump on thumb     Patient states she's had a bump on her left thumb for a week. Its progressively gotten bigger. Bump is not painful nor hot to the touch.         History of Present Illness       49-year-old female presenting with a lump on her left thumb.  She noticed this for the past 1 week increasing pain and tenderness to the touch of this affected area.  She does not recall any injuries or trauma.  Denies any numbness, tingling or weakness.        Review of Systems   Review of Systems   Constitutional: Negative.    HENT: Negative.     Eyes: Negative.    Respiratory: Negative.     Cardiovascular: Negative.    Gastrointestinal: Negative.    Genitourinary: Negative.    Musculoskeletal:  Positive for joint swelling and myalgias.   Skin: Negative.    Allergic/Immunologic: Negative.    Neurological: Negative.    Hematological: Negative.    Psychiatric/Behavioral: Negative.           Current Medications       Current Outpatient Medications:     Levonorgestrel (MIRENA, 52 MG, IU), by Intrauterine route, Disp: , Rfl:     Current Allergies     Allergies as of 11/15/2024 - Reviewed 11/15/2024   Allergen Reaction Noted    Zinc Hives and Shortness Of Breath 2017    Gabapentin Syncope 10/12/2022    Sulfa antibiotics Hives 10/02/2014     Sulfasalazine Hives 10/02/2014    Penicillins Hives 04/11/2022            The following portions of the patient's history were reviewed and updated as appropriate: allergies, current medications, past family history, past medical history, past social history, past surgical history and problem list.     Past Medical History:   Diagnosis Date    Inflammatory bowel disease        Past Surgical History:   Procedure Laterality Date    CHOLECYSTECTOMY         No family history on file.      Medications have been verified.        Objective   /73   Pulse 75   Temp 97.5 °F (36.4 °C)   Resp 16   SpO2 99%   No LMP recorded.       Physical Exam     Physical Exam  Vitals and nursing note reviewed.   Constitutional:       Appearance: She is well-developed.   HENT:      Head: Normocephalic.      Nose: Nose normal.   Eyes:      Pupils: Pupils are equal, round, and reactive to light.   Cardiovascular:      Rate and Rhythm: Normal rate and regular rhythm.   Pulmonary:      Effort: Pulmonary effort is normal.   Abdominal:      General: Abdomen is flat.   Musculoskeletal:         General: Swelling and tenderness present. Normal range of motion.      Left hand: Swelling present. No bony tenderness. Normal range of motion.        Arms:       Cervical back: Normal range of motion.   Skin:     General: Skin is warm and dry.   Neurological:      Mental Status: She is alert and oriented to person, place, and time.

## 2025-01-13 ENCOUNTER — HOSPITAL ENCOUNTER (OUTPATIENT)
Dept: RADIOLOGY | Facility: HOSPITAL | Age: 50
Discharge: HOME | End: 2025-01-13
Attending: NURSE PRACTITIONER
Payer: COMMERCIAL

## 2025-01-13 ENCOUNTER — TELEPHONE (OUTPATIENT)
Dept: SURGERY | Facility: CLINIC | Age: 50
End: 2025-01-13
Payer: COMMERCIAL

## 2025-01-13 DIAGNOSIS — R92.8 ABNORMAL FINDING ON BREAST IMAGING: Primary | ICD-10-CM

## 2025-01-13 DIAGNOSIS — R92.30 BREAST DENSITY: ICD-10-CM

## 2025-01-13 DIAGNOSIS — Z80.3 FAMILY HISTORY OF MALIGNANT NEOPLASM OF BREAST: ICD-10-CM

## 2025-01-13 RX ORDER — GADOBUTROL 604.72 MG/ML
6.8 INJECTION INTRAVENOUS ONCE
Status: COMPLETED | OUTPATIENT
Start: 2025-01-13 | End: 2025-01-13

## 2025-01-13 RX ADMIN — GADOBUTROL 6.8 ML: 604.72 INJECTION INTRAVENOUS at 11:57

## 2025-01-13 NOTE — TELEPHONE ENCOUNTER
Patient was called to review MRI findings, no answer.  Left voicemail.  Stop Being Watched message sent.  Prescription for 6-month follow-up right breast ultrasound due in April was ordered

## 2025-06-12 ENCOUNTER — HOSPITAL ENCOUNTER (EMERGENCY)
Facility: HOSPITAL | Age: 50
Discharge: HOME | End: 2025-06-12
Attending: EMERGENCY MEDICINE
Payer: COMMERCIAL

## 2025-06-12 ENCOUNTER — APPOINTMENT (EMERGENCY)
Dept: CT IMAGING | Facility: HOSPITAL | Age: 50
End: 2025-06-12
Payer: COMMERCIAL

## 2025-06-12 VITALS
TEMPERATURE: 97.8 F | WEIGHT: 152 LBS | OXYGEN SATURATION: 100 % | DIASTOLIC BLOOD PRESSURE: 74 MMHG | HEART RATE: 60 BPM | BODY MASS INDEX: 24.43 KG/M2 | HEIGHT: 66 IN | SYSTOLIC BLOOD PRESSURE: 124 MMHG | RESPIRATION RATE: 16 BRPM

## 2025-06-12 DIAGNOSIS — M54.50 ACUTE RIGHT-SIDED LOW BACK PAIN WITHOUT SCIATICA: Primary | ICD-10-CM

## 2025-06-12 LAB
ALBUMIN SERPL BCG-MCNC: 4.4 G/DL (ref 3.5–5)
ALP SERPL-CCNC: 61 U/L (ref 34–104)
ALT SERPL W P-5'-P-CCNC: 11 U/L (ref 7–52)
ANION GAP SERPL CALCULATED.3IONS-SCNC: 7 MMOL/L (ref 4–13)
AST SERPL W P-5'-P-CCNC: 15 U/L (ref 13–39)
BASOPHILS # BLD AUTO: 0.03 THOUSANDS/ÂΜL (ref 0–0.1)
BASOPHILS NFR BLD AUTO: 1 % (ref 0–1)
BILIRUB SERPL-MCNC: 0.42 MG/DL (ref 0.2–1)
BILIRUB UR QL STRIP: NEGATIVE
BUN SERPL-MCNC: 18 MG/DL (ref 5–25)
CALCIUM SERPL-MCNC: 9.7 MG/DL (ref 8.4–10.2)
CHLORIDE SERPL-SCNC: 105 MMOL/L (ref 96–108)
CLARITY UR: CLEAR
CO2 SERPL-SCNC: 26 MMOL/L (ref 21–32)
COLOR UR: YELLOW
CREAT SERPL-MCNC: 0.73 MG/DL (ref 0.6–1.3)
EOSINOPHIL # BLD AUTO: 0.2 THOUSAND/ÂΜL (ref 0–0.61)
EOSINOPHIL NFR BLD AUTO: 6 % (ref 0–6)
ERYTHROCYTE [DISTWIDTH] IN BLOOD BY AUTOMATED COUNT: 12.5 % (ref 11.6–15.1)
GFR SERPL CREATININE-BSD FRML MDRD: 97 ML/MIN/1.73SQ M
GLUCOSE SERPL-MCNC: 91 MG/DL (ref 65–140)
GLUCOSE UR STRIP-MCNC: NEGATIVE MG/DL
HCG SERPL QL: NEGATIVE
HCT VFR BLD AUTO: 38.5 % (ref 34.8–46.1)
HGB BLD-MCNC: 12.7 G/DL (ref 11.5–15.4)
HGB UR QL STRIP.AUTO: NEGATIVE
IMM GRANULOCYTES # BLD AUTO: 0 THOUSAND/UL (ref 0–0.2)
IMM GRANULOCYTES NFR BLD AUTO: 0 % (ref 0–2)
KETONES UR STRIP-MCNC: NEGATIVE MG/DL
LEUKOCYTE ESTERASE UR QL STRIP: NEGATIVE
LYMPHOCYTES # BLD AUTO: 1.62 THOUSANDS/ÂΜL (ref 0.6–4.47)
LYMPHOCYTES NFR BLD AUTO: 45 % (ref 14–44)
MCH RBC QN AUTO: 29.9 PG (ref 26.8–34.3)
MCHC RBC AUTO-ENTMCNC: 33 G/DL (ref 31.4–37.4)
MCV RBC AUTO: 91 FL (ref 82–98)
MONOCYTES # BLD AUTO: 0.27 THOUSAND/ÂΜL (ref 0.17–1.22)
MONOCYTES NFR BLD AUTO: 8 % (ref 4–12)
NEUTROPHILS # BLD AUTO: 1.41 THOUSANDS/ÂΜL (ref 1.85–7.62)
NEUTS SEG NFR BLD AUTO: 40 % (ref 43–75)
NITRITE UR QL STRIP: NEGATIVE
NRBC BLD AUTO-RTO: 0 /100 WBCS
PH UR STRIP.AUTO: 6 [PH]
PLATELET # BLD AUTO: 260 THOUSANDS/UL (ref 149–390)
PMV BLD AUTO: 9.9 FL (ref 8.9–12.7)
POTASSIUM SERPL-SCNC: 4.1 MMOL/L (ref 3.5–5.3)
PROT SERPL-MCNC: 7.1 G/DL (ref 6.4–8.4)
PROT UR STRIP-MCNC: NEGATIVE MG/DL
RBC # BLD AUTO: 4.25 MILLION/UL (ref 3.81–5.12)
SODIUM SERPL-SCNC: 138 MMOL/L (ref 135–147)
SP GR UR STRIP.AUTO: 1.02 (ref 1–1.03)
UROBILINOGEN UR STRIP-ACNC: <2 MG/DL
WBC # BLD AUTO: 3.53 THOUSAND/UL (ref 4.31–10.16)

## 2025-06-12 PROCEDURE — 99284 EMERGENCY DEPT VISIT MOD MDM: CPT

## 2025-06-12 PROCEDURE — 36415 COLL VENOUS BLD VENIPUNCTURE: CPT

## 2025-06-12 PROCEDURE — 96374 THER/PROPH/DIAG INJ IV PUSH: CPT

## 2025-06-12 PROCEDURE — 80053 COMPREHEN METABOLIC PANEL: CPT

## 2025-06-12 PROCEDURE — 84703 CHORIONIC GONADOTROPIN ASSAY: CPT | Performed by: EMERGENCY MEDICINE

## 2025-06-12 PROCEDURE — 99284 EMERGENCY DEPT VISIT MOD MDM: CPT | Performed by: EMERGENCY MEDICINE

## 2025-06-12 PROCEDURE — 96361 HYDRATE IV INFUSION ADD-ON: CPT

## 2025-06-12 PROCEDURE — 85025 COMPLETE CBC W/AUTO DIFF WBC: CPT

## 2025-06-12 PROCEDURE — 74176 CT ABD & PELVIS W/O CONTRAST: CPT

## 2025-06-12 PROCEDURE — 96375 TX/PRO/DX INJ NEW DRUG ADDON: CPT

## 2025-06-12 PROCEDURE — 81003 URINALYSIS AUTO W/O SCOPE: CPT

## 2025-06-12 RX ORDER — KETOROLAC TROMETHAMINE 30 MG/ML
15 INJECTION, SOLUTION INTRAMUSCULAR; INTRAVENOUS ONCE
Status: COMPLETED | OUTPATIENT
Start: 2025-06-12 | End: 2025-06-12

## 2025-06-12 RX ORDER — ONDANSETRON 2 MG/ML
4 INJECTION INTRAMUSCULAR; INTRAVENOUS ONCE
Status: COMPLETED | OUTPATIENT
Start: 2025-06-12 | End: 2025-06-12

## 2025-06-12 RX ADMIN — KETOROLAC TROMETHAMINE 15 MG: 30 INJECTION, SOLUTION INTRAMUSCULAR; INTRAVENOUS at 06:08

## 2025-06-12 RX ADMIN — ONDANSETRON 4 MG: 2 INJECTION INTRAMUSCULAR; INTRAVENOUS at 06:08

## 2025-06-12 RX ADMIN — SODIUM CHLORIDE 500 ML: 0.9 INJECTION, SOLUTION INTRAVENOUS at 06:06

## 2025-06-12 NOTE — ED NOTES
Pt advised that she cannot urinate at this time. Pt will use the call bell to alert the staff when she is ready to provide a specimen.      Merlyn Silva RN  06/12/25 [id number]

## 2025-06-12 NOTE — DISCHARGE INSTRUCTIONS
You have been seen for right low back pain. Please take tylenol and naproxen for pain. Return to the emergency department if you develop worsening pain, vomiting, fevers or any other symptoms of concern. Please follow up with your PCP by calling the number provided.

## 2025-06-12 NOTE — ED PROVIDER NOTES
Time reflects when diagnosis was documented in both MDM as applicable and the Disposition within this note       Time User Action Codes Description Comment    6/12/2025  7:13 AM Aburto, Jordon Add [M54.50] Acute right-sided low back pain without sciatica           ED Disposition       ED Disposition   Discharge    Condition   Stable    Date/Time   Thu Jun 12, 2025  7:13 AM    Comment   Oliva Saleem discharge to home/self care.                   Assessment & Plan       Medical Decision Making    49 y.o. female presenting for right flank pain.  Will obtain labs to evaluate for sepsis, anemia, electrolyte abnormality or SARA.  Will obtain UA and CT to evaluate for kidney stone.  No RLQ tenderness, do not suspect appendicitis.  Ddx would include MSK strain/spasm.    Reassessment: VSS, pain improved.   Reviewed labs and CT in detail.  No evidence of kidney stone, pain possibly due to degenerative changes noted in lumbar spine.    Disposition: I have discussed with the patient our plan to discharge them from the ED and the patient is in agreement with this plan.     Discharge Plan: PRN tylenol/naproxen per package insert for pain. RTED precautions emphasized. The patient was provided a written after visit summary with strict RTED precautions.     Followup: I have discussed with the patient plan to follow up with their PCP. Contact information provided in AVS.    Amount and/or Complexity of Data Reviewed  Labs: ordered.  Radiology: ordered.    Risk  Prescription drug management.             Medications   ketorolac (TORADOL) injection 15 mg (15 mg Intravenous Given 6/12/25 0608)   ondansetron (ZOFRAN) injection 4 mg (4 mg Intravenous Given 6/12/25 0608)   sodium chloride 0.9 % bolus 500 mL (0 mL Intravenous Stopped 6/12/25 0752)       ED Risk Strat Scores                    No data recorded                            History of Present Illness       Chief Complaint   Patient presents with    Flank Pain     Pt c/o of  right sided flank pain.       Past Medical History[1]   Past Surgical History[2]   Family History[3]   Social History[4]   E-Cigarette/Vaping      E-Cigarette/Vaping Substances      I have reviewed and agree with the history as documented.     Oliva Saleem is a 49 y.o. year old female with PMH of IBS presenting to the [facility] ED for right flank pain. Patient has had one day of right flank pain. Constant, no a/e factors and currently rated as 6/10. Pain occasionally radiating toward the right groin area. No fevers/chills. No anterior abdominal pain. She has had nausea though no vomiting. Patient denies associated dysuria/hematuria. Patient denies any fall or trauma to the area. The patient has taken ibuprofen at home for symptomatic treatment. Patient denies history of kidney stones. Surgical history notable for cholecystectomy.        History provided by:  Medical records and patient   used: No    Flank Pain  Associated symptoms: nausea    Associated symptoms: no chest pain, no chills, no cough, no dysuria, no fever, no hematuria, no shortness of breath and no vomiting        Review of Systems   Constitutional:  Negative for chills and fever.   Respiratory:  Negative for cough and shortness of breath.    Cardiovascular:  Negative for chest pain.   Gastrointestinal:  Positive for nausea. Negative for abdominal pain and vomiting.   Genitourinary:  Positive for flank pain. Negative for dysuria and hematuria.   Musculoskeletal:  Positive for back pain.   Neurological:  Negative for weakness and numbness.   All other systems reviewed and are negative.          Objective       ED Triage Vitals   Temperature Pulse Blood Pressure Respirations SpO2 Patient Position - Orthostatic VS   06/12/25 0539 06/12/25 0539 06/12/25 0539 06/12/25 0539 06/12/25 0539 06/12/25 0712   97.8 °F (36.6 °C) 87 162/86 18 99 % Lying      Temp src Heart Rate Source BP Location FiO2 (%) Pain Score    -- 06/12/25 0539  06/12/25 0712 -- 06/12/25 0608     Monitor Left arm  7      Vitals      Date and Time Temp Pulse SpO2 Resp BP Pain Score FACES Pain Rating User   06/12/25 0712 -- 60 100 % 16 124/74 -- --    06/12/25 0711 -- -- -- -- -- 5 --    06/12/25 0608 -- -- -- -- -- 7 --    06/12/25 0539 97.8 °F (36.6 °C) 87 99 % 18 162/86 -- -- EK            Physical Exam  Vitals and nursing note reviewed.   Constitutional:       General: She is not in acute distress.     Appearance: Normal appearance. She is well-developed. She is not ill-appearing, toxic-appearing or diaphoretic.   HENT:      Head: Normocephalic and atraumatic.      Nose: No congestion or rhinorrhea.     Eyes:      General:         Right eye: No discharge.         Left eye: No discharge.       Cardiovascular:      Rate and Rhythm: Normal rate and regular rhythm.   Pulmonary:      Effort: Pulmonary effort is normal. No accessory muscle usage or respiratory distress.      Breath sounds: Normal breath sounds. No stridor. No decreased breath sounds, wheezing, rhonchi or rales.   Abdominal:      General: Bowel sounds are normal. There is no distension.      Palpations: Abdomen is soft.      Tenderness: There is no abdominal tenderness. There is no guarding or rebound.     Musculoskeletal:      Cervical back: Normal range of motion and neck supple. No rigidity.      Thoracic back: No tenderness or bony tenderness.      Lumbar back: No tenderness or bony tenderness.      Right lower leg: No tenderness.      Left lower leg: No tenderness.     Skin:     Capillary Refill: Capillary refill takes less than 2 seconds.      Findings: No rash.     Neurological:      Mental Status: She is alert and oriented to person, place, and time.      GCS: GCS eye subscore is 4. GCS verbal subscore is 5. GCS motor subscore is 6.      Cranial Nerves: No dysarthria or facial asymmetry.      Sensory: Sensation is intact.      Motor: Motor function is intact.      Comments: 5/5 strength b/l  LE.  Sensation grossly intact throughout.     Psychiatric:         Mood and Affect: Mood normal.         Behavior: Behavior normal.         Results Reviewed       Procedure Component Value Units Date/Time    UA w Reflex to Microscopic w Reflex to Culture [[id number]] Collected: 06/12/25 0750    Lab Status: Final result Specimen: Urine, Clean Catch Updated: 06/12/25 0806     Color, UA Yellow     Clarity, UA Clear     Specific Gravity, UA 1.025     pH, UA 6.0     Leukocytes, UA Negative     Nitrite, UA Negative     Protein, UA Negative mg/dl      Glucose, UA Negative mg/dl      Ketones, UA Negative mg/dl      Urobilinogen, UA <2.0 mg/dl      Bilirubin, UA Negative     Occult Blood, UA Negative    hCG, qualitative pregnancy [[id number]]  (Normal) Collected: 06/12/25 0544    Lab Status: Final result Specimen: Blood from Arm, Left Updated: 06/12/25 0700     Preg, Serum Negative    Comprehensive metabolic panel [[id number]] Collected: 06/12/25 0544    Lab Status: Final result Specimen: Blood from Arm, Left Updated: 06/12/25 0615     Sodium 138 mmol/L      Potassium 4.1 mmol/L      Chloride 105 mmol/L      CO2 26 mmol/L      ANION GAP 7 mmol/L      BUN 18 mg/dL      Creatinine 0.73 mg/dL      Glucose 91 mg/dL      Calcium 9.7 mg/dL      AST 15 U/L      ALT 11 U/L      Alkaline Phosphatase 61 U/L      Total Protein 7.1 g/dL      Albumin 4.4 g/dL      Total Bilirubin 0.42 mg/dL      eGFR 97 ml/min/1.73sq m     Narrative:      National Kidney Disease Foundation guidelines for Chronic Kidney Disease (CKD):     Stage 1 with normal or high GFR (GFR > 90 mL/min/1.73 square meters)    Stage 2 Mild CKD (GFR = 60-89 mL/min/1.73 square meters)    Stage 3A Moderate CKD (GFR = 45-59 mL/min/1.73 square meters)    Stage 3B Moderate CKD (GFR = 30-44 mL/min/1.73 square meters)    Stage 4 Severe CKD (GFR = 15-29 mL/min/1.73 square meters)    Stage 5 End Stage CKD (GFR <15 mL/min/1.73 square meters)  Note: GFR calculation is accurate only with  a steady state creatinine    CBC and differential [[id number]]  (Abnormal) Collected: 06/12/25 0544    Lab Status: Final result Specimen: Blood from Arm, Left Updated: 06/12/25 0550     WBC 3.53 Thousand/uL      RBC 4.25 Million/uL      Hemoglobin 12.7 g/dL      Hematocrit 38.5 %      MCV 91 fL      MCH 29.9 pg      MCHC 33.0 g/dL      RDW 12.5 %      MPV 9.9 fL      Platelets 260 Thousands/uL      nRBC 0 /100 WBCs      Segmented % 40 %      Immature Grans % 0 %      Lymphocytes % 45 %      Monocytes % 8 %      Eosinophils Relative 6 %      Basophils Relative 1 %      Absolute Neutrophils 1.41 Thousands/µL      Absolute Immature Grans 0.00 Thousand/uL      Absolute Lymphocytes 1.62 Thousands/µL      Absolute Monocytes 0.27 Thousand/µL      Eosinophils Absolute 0.20 Thousand/µL      Basophils Absolute 0.03 Thousands/µL             CT renal stone study abdomen pelvis wo contrast   Final Interpretation by Francis Brandt MD (06/12 0710)      No radiopaque urinary tract calculi or obstructive uropathy.      No evidence of acute abdominopelvic process         Workstation performed: [id number]             Procedures    ED Medication and Procedure Management   Prior to Admission Medications   Prescriptions Last Dose Informant Patient Reported? Taking?   Levonorgestrel (MIRENA, 52 MG, IU)   Yes No   Sig: by Intrauterine route      Facility-Administered Medications: None     Discharge Medication List as of 6/12/2025  7:36 AM        CONTINUE these medications which have NOT CHANGED    Details   Levonorgestrel (MIRENA, 52 MG, IU) by Intrauterine route, Historical Med           No discharge procedures on file.  ED SEPSIS DOCUMENTATION   Time reflects when diagnosis was documented in both MDM as applicable and the Disposition within this note       Time User Action Codes Description Comment    6/12/2025  7:13 AM Jordon Aburto Add [M54.50] Acute right-sided low back pain without sciatica                      [1]    Past Medical History:  Diagnosis Date    Inflammatory bowel disease    [2]   Past Surgical History:  Procedure Laterality Date    CHOLECYSTECTOMY     [3] No family history on file.  [4]   Social History  Tobacco Use    Smoking status: Former     Types: Cigarettes    Smokeless tobacco: Never   Substance Use Topics    Alcohol use: Not Currently        Jordon Aburto DO  06/12/25 5328